# Patient Record
Sex: MALE | Race: BLACK OR AFRICAN AMERICAN | NOT HISPANIC OR LATINO | Employment: UNEMPLOYED | URBAN - METROPOLITAN AREA
[De-identification: names, ages, dates, MRNs, and addresses within clinical notes are randomized per-mention and may not be internally consistent; named-entity substitution may affect disease eponyms.]

---

## 2024-05-03 ENCOUNTER — HOSPITAL ENCOUNTER (EMERGENCY)
Facility: HOSPITAL | Age: 59
Discharge: HOME | End: 2024-05-04
Attending: EMERGENCY MEDICINE

## 2024-05-03 DIAGNOSIS — Z76.89 ENCOUNTER FOR SOCIAL WORK INTERVENTION: Primary | ICD-10-CM

## 2024-05-03 DIAGNOSIS — F22 DELUSION (MULTI): ICD-10-CM

## 2024-05-03 LAB
BASOPHILS # BLD AUTO: 0.04 X10*3/UL (ref 0–0.1)
BASOPHILS NFR BLD AUTO: 0.7 %
EOSINOPHIL # BLD AUTO: 0.18 X10*3/UL (ref 0–0.7)
EOSINOPHIL NFR BLD AUTO: 3.4 %
ERYTHROCYTE [DISTWIDTH] IN BLOOD BY AUTOMATED COUNT: 13.4 % (ref 11.5–14.5)
HCT VFR BLD AUTO: 39.9 % (ref 41–52)
HGB BLD-MCNC: 12.9 G/DL (ref 13.5–17.5)
IMM GRANULOCYTES # BLD AUTO: 0.02 X10*3/UL (ref 0–0.7)
IMM GRANULOCYTES NFR BLD AUTO: 0.4 % (ref 0–0.9)
LYMPHOCYTES # BLD AUTO: 1.53 X10*3/UL (ref 1.2–4.8)
LYMPHOCYTES NFR BLD AUTO: 28.5 %
MCH RBC QN AUTO: 29.2 PG (ref 26–34)
MCHC RBC AUTO-ENTMCNC: 32.3 G/DL (ref 32–36)
MCV RBC AUTO: 90 FL (ref 80–100)
MONOCYTES # BLD AUTO: 0.49 X10*3/UL (ref 0.1–1)
MONOCYTES NFR BLD AUTO: 9.1 %
NEUTROPHILS # BLD AUTO: 3.1 X10*3/UL (ref 1.2–7.7)
NEUTROPHILS NFR BLD AUTO: 57.9 %
NRBC BLD-RTO: 0 /100 WBCS (ref 0–0)
PLATELET # BLD AUTO: 263 X10*3/UL (ref 150–450)
RBC # BLD AUTO: 4.42 X10*6/UL (ref 4.5–5.9)
WBC # BLD AUTO: 5.4 X10*3/UL (ref 4.4–11.3)

## 2024-05-03 PROCEDURE — 99284 EMERGENCY DEPT VISIT MOD MDM: CPT

## 2024-05-03 PROCEDURE — 85025 COMPLETE CBC W/AUTO DIFF WBC: CPT | Performed by: EMERGENCY MEDICINE

## 2024-05-03 PROCEDURE — 36415 COLL VENOUS BLD VENIPUNCTURE: CPT | Performed by: EMERGENCY MEDICINE

## 2024-05-03 PROCEDURE — 80053 COMPREHEN METABOLIC PANEL: CPT | Performed by: EMERGENCY MEDICINE

## 2024-05-03 PROCEDURE — 82077 ASSAY SPEC XCP UR&BREATH IA: CPT | Performed by: EMERGENCY MEDICINE

## 2024-05-03 PROCEDURE — 99284 EMERGENCY DEPT VISIT MOD MDM: CPT | Performed by: EMERGENCY MEDICINE

## 2024-05-03 ASSESSMENT — COLUMBIA-SUICIDE SEVERITY RATING SCALE - C-SSRS
6. HAVE YOU EVER DONE ANYTHING, STARTED TO DO ANYTHING, OR PREPARED TO DO ANYTHING TO END YOUR LIFE?: NO
1. IN THE PAST MONTH, HAVE YOU WISHED YOU WERE DEAD OR WISHED YOU COULD GO TO SLEEP AND NOT WAKE UP?: NO
2. HAVE YOU ACTUALLY HAD ANY THOUGHTS OF KILLING YOURSELF?: NO

## 2024-05-04 VITALS
WEIGHT: 199 LBS | TEMPERATURE: 97.6 F | HEART RATE: 76 BPM | OXYGEN SATURATION: 97 % | SYSTOLIC BLOOD PRESSURE: 148 MMHG | RESPIRATION RATE: 16 BRPM | DIASTOLIC BLOOD PRESSURE: 96 MMHG | HEIGHT: 73 IN | BODY MASS INDEX: 26.37 KG/M2

## 2024-05-04 LAB
ALBUMIN SERPL BCP-MCNC: 3.9 G/DL (ref 3.4–5)
ALP SERPL-CCNC: 55 U/L (ref 33–120)
ALT SERPL W P-5'-P-CCNC: 9 U/L (ref 10–52)
AMPHETAMINES UR QL SCN: NORMAL
ANION GAP SERPL CALC-SCNC: 14 MMOL/L (ref 10–20)
APPEARANCE UR: CLEAR
AST SERPL W P-5'-P-CCNC: 16 U/L (ref 9–39)
BARBITURATES UR QL SCN: NORMAL
BENZODIAZ UR QL SCN: NORMAL
BILIRUB SERPL-MCNC: 0.4 MG/DL (ref 0–1.2)
BILIRUB UR STRIP.AUTO-MCNC: NEGATIVE MG/DL
BUN SERPL-MCNC: 19 MG/DL (ref 6–23)
BZE UR QL SCN: NORMAL
CALCIUM SERPL-MCNC: 9.4 MG/DL (ref 8.6–10.6)
CANNABINOIDS UR QL SCN: NORMAL
CHLORIDE SERPL-SCNC: 104 MMOL/L (ref 98–107)
CO2 SERPL-SCNC: 28 MMOL/L (ref 21–32)
COLOR UR: NORMAL
CREAT SERPL-MCNC: 1.12 MG/DL (ref 0.5–1.3)
EGFRCR SERPLBLD CKD-EPI 2021: 76 ML/MIN/1.73M*2
ETHANOL SERPL-MCNC: <10 MG/DL
FENTANYL+NORFENTANYL UR QL SCN: NORMAL
GLUCOSE SERPL-MCNC: 91 MG/DL (ref 74–99)
GLUCOSE UR STRIP.AUTO-MCNC: NORMAL MG/DL
HOLD SPECIMEN: NORMAL
KETONES UR STRIP.AUTO-MCNC: NEGATIVE MG/DL
LEUKOCYTE ESTERASE UR QL STRIP.AUTO: NEGATIVE
METHADONE UR QL SCN: NORMAL
NITRITE UR QL STRIP.AUTO: NEGATIVE
OPIATES UR QL SCN: NORMAL
OXYCODONE+OXYMORPHONE UR QL SCN: NORMAL
PCP UR QL SCN: NORMAL
PH UR STRIP.AUTO: 6.5 [PH]
POTASSIUM SERPL-SCNC: 3.7 MMOL/L (ref 3.5–5.3)
PROT SERPL-MCNC: 8 G/DL (ref 6.4–8.2)
PROT UR STRIP.AUTO-MCNC: NEGATIVE MG/DL
RBC # UR STRIP.AUTO: NEGATIVE /UL
SODIUM SERPL-SCNC: 142 MMOL/L (ref 136–145)
SP GR UR STRIP.AUTO: 1.02
UROBILINOGEN UR STRIP.AUTO-MCNC: NORMAL MG/DL

## 2024-05-04 PROCEDURE — 80307 DRUG TEST PRSMV CHEM ANLYZR: CPT | Performed by: EMERGENCY MEDICINE

## 2024-05-04 PROCEDURE — 81003 URINALYSIS AUTO W/O SCOPE: CPT | Performed by: EMERGENCY MEDICINE

## 2024-05-04 SDOH — HEALTH STABILITY: MENTAL HEALTH: NON-SPECIFIC ACTIVE SUICIDAL THOUGHTS (PAST 1 MONTH): NO

## 2024-05-04 SDOH — HEALTH STABILITY: MENTAL HEALTH: SUICIDAL BEHAVIOR (LIFETIME): NO

## 2024-05-04 SDOH — HEALTH STABILITY: MENTAL HEALTH: ANXIETY SYMPTOMS: NO PROBLEMS REPORTED OR OBSERVED.

## 2024-05-04 SDOH — HEALTH STABILITY: MENTAL HEALTH: WISH TO BE DEAD (PAST 1 MONTH): NO

## 2024-05-04 SDOH — ECONOMIC STABILITY: HOUSING INSECURITY: FEELS SAFE LIVING IN HOME: NO

## 2024-05-04 SDOH — HEALTH STABILITY: MENTAL HEALTH: DEPRESSION SYMPTOMS: NO PROBLEMS REPORTED OR OBSERVED.

## 2024-05-04 ASSESSMENT — LIFESTYLE VARIABLES
SUBSTANCE_ABUSE_PAST_12_MONTHS: NO
PRESCIPTION_ABUSE_PAST_12_MONTHS: NO
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
## 2024-05-04 NOTE — PROGRESS NOTES
Care of patient was taken over at [address]. See previous provider note for details of history and presentation, initial workup and management.     Briefly, this is a 50-year-old male who presents to the emergency department requesting to speak with social work regarding his housing status.  Noted to have mood disorder endorses taking medications regularly.  Was noted to be linear, appropriate without any SI, HI or inappropriate internal stimulation.  Signed out pending social work with resources.    During my care of the patient, social work did evaluate the patient, please see their note for further details, discussed with patient he will likely be best served for [facility], she did additionally speak with the patient's brother and confirmed does have  that has been working with him.  During my evaluation of the patient, patient stated that he was a prophet and was going to heaven, continued to endorse these thoughts-possibly his baseline with his underlying psych history, however will have EPAT evaluate the patient.     EPAT did see the patient, they endorse patient is at his baseline and does not meet inpatient psych criteria.  He remains stable, is discharged with a bus pass and plan for [facility].       Diagnoses as of 05/04/24 1922   Encounter for social work intervention   Delusion (Multi)        Pt was discussed with ED Attending, Dr. Mills.     Valeriano Vincent DO   EM PGY3

## 2024-05-04 NOTE — ED TRIAGE NOTES
Patient to ED requesting to speak to a psychiatrist about his meds he should be taking and also requesting to speak to a  for help with housing. Patient denies SI, HI, and hallucinations. Patient has been staying at [address] for a few weeks and reports he needs somewhere new to stay due to conflicts.

## 2024-05-04 NOTE — DISCHARGE INSTRUCTIONS
You were seen in the ED for housing insecurity. You should follow up with your regular doctor in 2-3d for a re-check.  You should return to the ED immediately if you develop other concerning symptoms.
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
## 2024-06-18 ENCOUNTER — HOSPITAL ENCOUNTER (EMERGENCY)
Facility: HOSPITAL | Age: 59
Discharge: HOME | End: 2024-06-18

## 2024-06-18 VITALS
RESPIRATION RATE: 18 BRPM | HEART RATE: 86 BPM | SYSTOLIC BLOOD PRESSURE: 160 MMHG | DIASTOLIC BLOOD PRESSURE: 100 MMHG | OXYGEN SATURATION: 96 % | TEMPERATURE: 98.4 F

## 2024-06-18 PROCEDURE — 82947 ASSAY GLUCOSE BLOOD QUANT: CPT

## 2024-06-18 PROCEDURE — 4500999001 HC ED NO CHARGE

## 2024-06-18 ASSESSMENT — COLUMBIA-SUICIDE SEVERITY RATING SCALE - C-SSRS
2. HAVE YOU ACTUALLY HAD ANY THOUGHTS OF KILLING YOURSELF?: NO
1. IN THE PAST MONTH, HAVE YOU WISHED YOU WERE DEAD OR WISHED YOU COULD GO TO SLEEP AND NOT WAKE UP?: NO
6. HAVE YOU EVER DONE ANYTHING, STARTED TO DO ANYTHING, OR PREPARED TO DO ANYTHING TO END YOUR LIFE?: NO

## 2024-06-18 NOTE — ED TRIAGE NOTES
"REPORTS TO ED FOR \"DIABETIC CHECK UP\" DOESN'T REPORT ANY SYMPTOMS. STATES HE DOESN'T HAVE DIABETES BUT CAME \"TO SEE IF DID.\" DENIES ABD PAIN, CP, SOB, BV, HA, DIZZINESS, N/V. BGL 95  "

## 2024-06-19 LAB — GLUCOSE BLD MANUAL STRIP-MCNC: 95 MG/DL (ref 74–99)

## 2024-09-16 ENCOUNTER — HOSPITAL ENCOUNTER (EMERGENCY)
Facility: HOSPITAL | Age: 59
Discharge: HOME | End: 2024-09-16

## 2024-09-16 VITALS
TEMPERATURE: 98.1 F | DIASTOLIC BLOOD PRESSURE: 88 MMHG | HEART RATE: 84 BPM | WEIGHT: 199 LBS | BODY MASS INDEX: 26.37 KG/M2 | RESPIRATION RATE: 16 BRPM | OXYGEN SATURATION: 99 % | HEIGHT: 73 IN | SYSTOLIC BLOOD PRESSURE: 138 MMHG

## 2024-09-16 DIAGNOSIS — Z00.00 WELL ADULT HEALTH CHECK: Primary | ICD-10-CM

## 2024-09-16 PROBLEM — I10 HYPERTENSION: Status: ACTIVE | Noted: 2024-03-12

## 2024-09-16 PROBLEM — R41.844 FRONTAL LOBE AND EXECUTIVE FUNCTION DEFICIT: Status: ACTIVE | Noted: 2024-03-06

## 2024-09-16 PROBLEM — F23: Status: ACTIVE | Noted: 2023-05-25

## 2024-09-16 PROCEDURE — 99284 EMERGENCY DEPT VISIT MOD MDM: CPT | Performed by: NURSE PRACTITIONER

## 2024-09-16 PROCEDURE — 99282 EMERGENCY DEPT VISIT SF MDM: CPT

## 2024-09-16 SDOH — HEALTH STABILITY: MENTAL HEALTH: HAVE YOU ACTUALLY HAD ANY THOUGHTS OF KILLING YOURSELF?: NO

## 2024-09-16 SDOH — HEALTH STABILITY: MENTAL HEALTH: SUICIDE ASSESSMENT: ADULT (C-SSRS)

## 2024-09-16 SDOH — HEALTH STABILITY: MENTAL HEALTH: BEHAVIORS/MOOD: ANXIOUS;AGITATED

## 2024-09-16 SDOH — HEALTH STABILITY: MENTAL HEALTH: BEHAVIORAL HEALTH(WDL): EXCEPTIONS TO WDL

## 2024-09-16 SDOH — HEALTH STABILITY: MENTAL HEALTH: DELUSIONS: PERSECUTORY;RELIGIOUS

## 2024-09-16 SDOH — HEALTH STABILITY: MENTAL HEALTH: HAVE YOU WISHED YOU WERE DEAD OR WISHED YOU COULD GO TO SLEEP AND NOT WAKE UP?: NO

## 2024-09-16 SDOH — HEALTH STABILITY: MENTAL HEALTH: HAVE YOU EVER DONE ANYTHING, STARTED TO DO ANYTHING, OR PREPARED TO DO ANYTHING TO END YOUR LIFE?: NO

## 2024-09-16 ASSESSMENT — PAIN - FUNCTIONAL ASSESSMENT: PAIN_FUNCTIONAL_ASSESSMENT: 0-10

## 2024-09-16 ASSESSMENT — LIFESTYLE VARIABLES
TOTAL SCORE: 0
EVER FELT BAD OR GUILTY ABOUT YOUR DRINKING: NO
HAVE YOU EVER FELT YOU SHOULD CUT DOWN ON YOUR DRINKING: NO
EVER HAD A DRINK FIRST THING IN THE MORNING TO STEADY YOUR NERVES TO GET RID OF A HANGOVER: NO
HAVE PEOPLE ANNOYED YOU BY CRITICIZING YOUR DRINKING: NO

## 2024-09-16 ASSESSMENT — PAIN SCALES - GENERAL: PAINLEVEL_OUTOF10: 0 - NO PAIN
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
## 2024-11-24 ENCOUNTER — HOSPITAL ENCOUNTER (EMERGENCY)
Facility: HOSPITAL | Age: 59
Discharge: HOME | End: 2024-11-24

## 2024-11-24 VITALS
SYSTOLIC BLOOD PRESSURE: 143 MMHG | RESPIRATION RATE: 16 BRPM | OXYGEN SATURATION: 98 % | HEIGHT: 73 IN | TEMPERATURE: 97.9 F | HEART RATE: 79 BPM | DIASTOLIC BLOOD PRESSURE: 89 MMHG | WEIGHT: 206 LBS | BODY MASS INDEX: 27.3 KG/M2

## 2024-11-24 DIAGNOSIS — K08.89 PAIN, DENTAL: Primary | ICD-10-CM

## 2024-11-24 DIAGNOSIS — Z59.819 HOUSING INSECURITY: ICD-10-CM

## 2024-11-24 DIAGNOSIS — M54.50 RIGHT-SIDED LOW BACK PAIN WITHOUT SCIATICA, UNSPECIFIED CHRONICITY: ICD-10-CM

## 2024-11-24 PROCEDURE — 99284 EMERGENCY DEPT VISIT MOD MDM: CPT | Performed by: PHYSICIAN ASSISTANT

## 2024-11-24 PROCEDURE — 2500000001 HC RX 250 WO HCPCS SELF ADMINISTERED DRUGS (ALT 637 FOR MEDICARE OP): Performed by: PHYSICIAN ASSISTANT

## 2024-11-24 PROCEDURE — 99283 EMERGENCY DEPT VISIT LOW MDM: CPT

## 2024-11-24 RX ORDER — IBUPROFEN 600 MG/1
600 TABLET ORAL EVERY 6 HOURS PRN
Qty: 30 TABLET | Refills: 0 | Status: SHIPPED | OUTPATIENT
Start: 2024-11-24 | End: 2024-11-24

## 2024-11-24 RX ORDER — ACETAMINOPHEN 325 MG/1
650 TABLET ORAL EVERY 6 HOURS PRN
Qty: 30 TABLET | Refills: 0 | Status: SHIPPED | OUTPATIENT
Start: 2024-11-24 | End: 2024-11-24

## 2024-11-24 RX ORDER — CLINDAMYCIN HYDROCHLORIDE 150 MG/1
450 CAPSULE ORAL 3 TIMES DAILY
Qty: 63 CAPSULE | Refills: 0 | Status: SHIPPED | OUTPATIENT
Start: 2024-11-24 | End: 2024-12-01

## 2024-11-24 RX ORDER — IBUPROFEN 600 MG/1
600 TABLET ORAL EVERY 6 HOURS PRN
Qty: 30 TABLET | Refills: 0 | Status: SHIPPED | OUTPATIENT
Start: 2024-11-24

## 2024-11-24 RX ORDER — CLINDAMYCIN HYDROCHLORIDE 150 MG/1
450 CAPSULE ORAL 3 TIMES DAILY
Qty: 63 CAPSULE | Refills: 0 | Status: SHIPPED | OUTPATIENT
Start: 2024-11-24 | End: 2024-11-24

## 2024-11-24 RX ORDER — ACETAMINOPHEN 325 MG/1
975 TABLET ORAL ONCE
Status: COMPLETED | OUTPATIENT
Start: 2024-11-24 | End: 2024-11-24

## 2024-11-24 RX ORDER — ACETAMINOPHEN 325 MG/1
650 TABLET ORAL EVERY 6 HOURS PRN
Qty: 30 TABLET | Refills: 0 | Status: SHIPPED | OUTPATIENT
Start: 2024-11-24

## 2024-11-24 RX ADMIN — CLINDAMYCIN HYDROCHLORIDE 450 MG: 300 CAPSULE ORAL at 08:23

## 2024-11-24 RX ADMIN — ACETAMINOPHEN 975 MG: 325 TABLET ORAL at 08:23

## 2024-11-24 SDOH — ECONOMIC STABILITY - HOUSING INSECURITY: HOUSING INSTABILITY UNSPECIFIED: Z59.819

## 2024-11-24 ASSESSMENT — PAIN - FUNCTIONAL ASSESSMENT: PAIN_FUNCTIONAL_ASSESSMENT: 0-10

## 2024-11-24 ASSESSMENT — COLUMBIA-SUICIDE SEVERITY RATING SCALE - C-SSRS
1. IN THE PAST MONTH, HAVE YOU WISHED YOU WERE DEAD OR WISHED YOU COULD GO TO SLEEP AND NOT WAKE UP?: NO
2. HAVE YOU ACTUALLY HAD ANY THOUGHTS OF KILLING YOURSELF?: NO
6. HAVE YOU EVER DONE ANYTHING, STARTED TO DO ANYTHING, OR PREPARED TO DO ANYTHING TO END YOUR LIFE?: NO

## 2024-11-24 ASSESSMENT — PAIN SCALES - GENERAL: PAINLEVEL_OUTOF10: 0 - NO PAIN

## 2024-11-24 NOTE — ED TRIAGE NOTES
PT states when he eats he gets abdominal pain and has been losing weight. States he fell out and has pain in mouth. Wants to get his blood checked

## 2024-11-24 NOTE — ED PROVIDER NOTES
"Emergency Department Provider Note        History of Present Illness     59-year-old male with history of schizophrenia, HTN, housing insecurity presenting for dental pain and right lower back pain.  States his mouth has been hurting for \"a while\" has not been to a dentist for.  Denies any trouble breathing or trouble swallowing.  Denies any fevers chills night sweats or rigors.  Has not taken anything for it.  For his right lower back pain states that he fell \"a while ago\" which appears to be greater than weeks ago.  States he has had pain since then.  Pain only happens with certain movements like lateral rotation.  Minimal to no pain currently.  Denies any lower extremity weakness or paresthesias.  Denies any bowel or bladder incontinence.  Does not take anything for the pain.  Also is requesting laboratory work stating that his sperm is drying out.  States he wants this looked into more.  He denies any AH/VH/SI/HI    External Records Reviewed including ED notes, H&P, Discharge Summary, outpatient PCP/specialist notes.  Physical Exam     Triage Vitals: T 36.6 °C (97.9 °F)  HR 79  /89  RR 16  O2 98 %    GEN: NAD  EYES:  EOMs grossly intact, anicteric sclera  JUSTINE: Mucosa moist.  No trismus, normal voice, tolerate secretions without difficulty, uvula is midline.  Eroded molars worse in the right lower posterior with mild gumline swelling medial to it  NECK: Supple.  CARD: RRR  PULMONARY: Moving air well. Clear all lung fields.  ABDOMEN: Soft, no guarding, no rigidity. Nontender. NABS  EXTREMITIES: Full ROM, no pitting edema,   Back: No midline tenderness step-offs or deformities, very mild right lower paraspinal tenderness without any overlying skin change  SKIN: Intact, warm and dry  NEURO: Alert and oriented x 3, speech is clear, no obvious deficits noted.  Intact sensation/strength in lower extremities with no saddle paresthesia, intact deep tendon reflexes, nonantalgic gait        Medical Decision " "Making & ED Course     59-year-old male presenting for dental pain and chronic lower back pains.  On exam he is well-appearing ambulating [place].  Vital signs stable.  Neuro intact in his lower extremities.  No red flag signs symptoms for back pain to be concern for any spinal cord pathology.  Additionally no red flag signs or symptoms to be concern for any obvious drainable dental abscess, no dental abscess apparent on exam either.  Patient did become mildly agitated when was told that we would not be able to do what laboratory work to investigate his \"inadequate sperm\"however later excepting.  Will give him a course of clindamycin for his dental pain out of concern for underlying infection with known penicillin and allergy.  Will give analgesics.  Will give referral for PCP and dental clinic.  Return precautions reviewed.    Diagnoses as of 11/24/24 0815   Pain, dental   Right-sided low back pain without sciatica, unspecified chronicity   Housing insecurity     No orders to display     Labs Reviewed - No data to display    ----------------------------------------------------------------------------------------------------------------------------    This note was dictated using a speech recognition program.  While an attempt was made at proof reading to minimize errors, minor errors in transcription may be present call for questions.     Kirk Andersen PA-C  11/24/24 0820    "

## 2024-11-24 NOTE — DISCHARGE INSTRUCTIONS
Take the full course of the antibiotics.  Take the Tylenol and ibuprofen for any pain.  Follow-up with the dental clinic, call [phone] to schedule an appointment.  It is very important follow-up with a primary care doctor, if you have not heard from them call the number listed below

## 2025-01-27 ENCOUNTER — HOSPITAL ENCOUNTER (EMERGENCY)
Facility: HOSPITAL | Age: 60
Discharge: LEFT BEFORE DISCHARGE | End: 2025-01-27

## 2025-01-27 VITALS
SYSTOLIC BLOOD PRESSURE: 179 MMHG | HEART RATE: 88 BPM | OXYGEN SATURATION: 99 % | TEMPERATURE: 98.8 F | BODY MASS INDEX: 26.51 KG/M2 | RESPIRATION RATE: 18 BRPM | DIASTOLIC BLOOD PRESSURE: 106 MMHG | WEIGHT: 200 LBS | HEIGHT: 73 IN

## 2025-01-27 DIAGNOSIS — R63.4 UNEXPLAINED WEIGHT LOSS: Primary | ICD-10-CM

## 2025-01-27 LAB
ALBUMIN SERPL BCP-MCNC: 4 G/DL (ref 3.4–5)
ALP SERPL-CCNC: 54 U/L (ref 33–120)
ALT SERPL W P-5'-P-CCNC: 29 U/L (ref 10–52)
ANION GAP SERPL CALC-SCNC: 12 MMOL/L (ref 10–20)
APPEARANCE UR: CLEAR
AST SERPL W P-5'-P-CCNC: 22 U/L (ref 9–39)
BASOPHILS # BLD AUTO: 0.03 X10*3/UL (ref 0–0.1)
BASOPHILS NFR BLD AUTO: 0.4 %
BILIRUB SERPL-MCNC: 0.5 MG/DL (ref 0–1.2)
BILIRUB UR STRIP.AUTO-MCNC: NEGATIVE MG/DL
BUN SERPL-MCNC: 13 MG/DL (ref 6–23)
CALCIUM SERPL-MCNC: 9.7 MG/DL (ref 8.6–10.6)
CHLORIDE SERPL-SCNC: 103 MMOL/L (ref 98–107)
CO2 SERPL-SCNC: 27 MMOL/L (ref 21–32)
COLOR UR: YELLOW
CREAT SERPL-MCNC: 0.85 MG/DL (ref 0.5–1.3)
EGFRCR SERPLBLD CKD-EPI 2021: >90 ML/MIN/1.73M*2
EOSINOPHIL # BLD AUTO: 0.14 X10*3/UL (ref 0–0.7)
EOSINOPHIL NFR BLD AUTO: 2.1 %
ERYTHROCYTE [DISTWIDTH] IN BLOOD BY AUTOMATED COUNT: 12.1 % (ref 11.5–14.5)
GLUCOSE SERPL-MCNC: 84 MG/DL (ref 74–99)
GLUCOSE UR STRIP.AUTO-MCNC: NORMAL MG/DL
HCT VFR BLD AUTO: 36.3 % (ref 41–52)
HGB BLD-MCNC: 11.8 G/DL (ref 13.5–17.5)
IMM GRANULOCYTES # BLD AUTO: 0.03 X10*3/UL (ref 0–0.7)
IMM GRANULOCYTES NFR BLD AUTO: 0.4 % (ref 0–0.9)
KETONES UR STRIP.AUTO-MCNC: NEGATIVE MG/DL
LEUKOCYTE ESTERASE UR QL STRIP.AUTO: NEGATIVE
LIPASE SERPL-CCNC: 32 U/L (ref 9–82)
LYMPHOCYTES # BLD AUTO: 1.19 X10*3/UL (ref 1.2–4.8)
LYMPHOCYTES NFR BLD AUTO: 17.8 %
MCH RBC QN AUTO: 29.4 PG (ref 26–34)
MCHC RBC AUTO-ENTMCNC: 32.5 G/DL (ref 32–36)
MCV RBC AUTO: 91 FL (ref 80–100)
MONOCYTES # BLD AUTO: 0.56 X10*3/UL (ref 0.1–1)
MONOCYTES NFR BLD AUTO: 8.4 %
MUCOUS THREADS #/AREA URNS AUTO: NORMAL /LPF
NEUTROPHILS # BLD AUTO: 4.75 X10*3/UL (ref 1.2–7.7)
NEUTROPHILS NFR BLD AUTO: 70.9 %
NITRITE UR QL STRIP.AUTO: NEGATIVE
NRBC BLD-RTO: 0 /100 WBCS (ref 0–0)
PH UR STRIP.AUTO: 5.5 [PH]
PLATELET # BLD AUTO: 263 X10*3/UL (ref 150–450)
POTASSIUM SERPL-SCNC: 3.6 MMOL/L (ref 3.5–5.3)
PROT SERPL-MCNC: 8.1 G/DL (ref 6.4–8.2)
PROT UR STRIP.AUTO-MCNC: ABNORMAL MG/DL
RBC # BLD AUTO: 4.01 X10*6/UL (ref 4.5–5.9)
RBC # UR STRIP.AUTO: NEGATIVE /UL
RBC #/AREA URNS AUTO: NORMAL /HPF
SODIUM SERPL-SCNC: 138 MMOL/L (ref 136–145)
SP GR UR STRIP.AUTO: 1.03
TSH SERPL-ACNC: 1.72 MIU/L (ref 0.44–3.98)
UROBILINOGEN UR STRIP.AUTO-MCNC: ABNORMAL MG/DL
WBC # BLD AUTO: 6.7 X10*3/UL (ref 4.4–11.3)
WBC #/AREA URNS AUTO: NORMAL /HPF

## 2025-01-27 PROCEDURE — 80053 COMPREHEN METABOLIC PANEL: CPT | Performed by: PHYSICIAN ASSISTANT

## 2025-01-27 PROCEDURE — 99283 EMERGENCY DEPT VISIT LOW MDM: CPT

## 2025-01-27 PROCEDURE — 81001 URINALYSIS AUTO W/SCOPE: CPT | Performed by: PHYSICIAN ASSISTANT

## 2025-01-27 PROCEDURE — 85025 COMPLETE CBC W/AUTO DIFF WBC: CPT | Performed by: PHYSICIAN ASSISTANT

## 2025-01-27 PROCEDURE — 99284 EMERGENCY DEPT VISIT MOD MDM: CPT | Performed by: PHYSICIAN ASSISTANT

## 2025-01-27 PROCEDURE — 84443 ASSAY THYROID STIM HORMONE: CPT | Performed by: PHYSICIAN ASSISTANT

## 2025-01-27 PROCEDURE — 36415 COLL VENOUS BLD VENIPUNCTURE: CPT | Performed by: PHYSICIAN ASSISTANT

## 2025-01-27 PROCEDURE — 83690 ASSAY OF LIPASE: CPT | Performed by: PHYSICIAN ASSISTANT

## 2025-01-27 ASSESSMENT — PAIN - FUNCTIONAL ASSESSMENT: PAIN_FUNCTIONAL_ASSESSMENT: 0-10

## 2025-01-27 ASSESSMENT — PAIN DESCRIPTION - PAIN TYPE: TYPE: ACUTE PAIN

## 2025-01-27 ASSESSMENT — PAIN DESCRIPTION - LOCATION: LOCATION: ABDOMEN

## 2025-01-27 ASSESSMENT — PAIN SCALES - GENERAL: PAINLEVEL_OUTOF10: 3

## 2025-01-27 NOTE — ED PROVIDER NOTES
Emergency Department Provider Note        History of Present Illness     History provided by: Patient  Limitations to History: None  External Records Reviewed with Brief Summary:  notes from [facility] today as well as previous ER visits    HPI:  Gareth Kaminski is a 59 y.o. male with history of schizophrenia, anemia, hypertension who presents today for evaluation of abdominal discomfort and weight loss.  Patient states he had unexplained weight loss approximately 10 to 15 pounds over the last month, patient states he is not sure why it is happening, denies night sweats.  Patient also endorses that his entire abdomen feels uncomfortable, denies nausea vomiting fevers chills, urinary symptoms, blood in his stool, change in stool caliber, constipation or diarrhea.  Patient denies chest pain or shortness of breath.  Patient is requesting blood work.    Physical Exam   Triage vitals:  T 37.1 °C (98.8 °F)  HR 88  BP (!) 179/106  RR 18  O2 99 % None (Room air)    Physical Exam  Vitals and nursing note reviewed.   Constitutional:       General: He is not in acute distress.     Appearance: Normal appearance. He is not toxic-appearing.   HENT:      Head: Normocephalic and atraumatic.      Nose: Nose normal.   Eyes:      Extraocular Movements: Extraocular movements intact.   Cardiovascular:      Rate and Rhythm: Normal rate and regular rhythm.   Pulmonary:      Effort: Pulmonary effort is normal.   Abdominal:      General: There is no distension.      Palpations: Abdomen is soft. There is no hepatomegaly or mass.      Tenderness: There is no abdominal tenderness. There is no right CVA tenderness, left CVA tenderness, guarding or rebound.   Musculoskeletal:         General: Normal range of motion.      Cervical back: Normal range of motion and neck supple.   Skin:     General: Skin is warm and dry.   Neurological:      General: No focal deficit present.      Mental Status: He is alert.   Psychiatric:         Mood and Affect: Mood  normal.         Thought Content: Thought content normal.        CT chest abdomen pelvis w IV contrast    (Results Pending)     Labs Reviewed   CBC WITH AUTO DIFFERENTIAL   COMPREHENSIVE METABOLIC PANEL   LIPASE   URINALYSIS WITH REFLEX CULTURE AND MICROSCOPIC    Narrative:     The following orders were created for panel order Urinalysis with Reflex Culture and Microscopic.  Procedure                               Abnormality         Status                     ---------                               -----------         ------                     Urinalysis with Reflex C...[[id number]]                                                 Extra Urine Gray Tube[[id number]]                                                         Please view results for these tests on the individual orders.   URINALYSIS WITH REFLEX CULTURE AND MICROSCOPIC   EXTRA URINE GRAY TUBE     ED Course as of 01/27/25 1646   Mon Jan 27, 2025   1513 Patient is refusing CT at this time, I discussed with him that the indication is to check for cancer and with his unexplained weight loss would be irresponsible of us not to check.  Patient does have capacity, he does have a history of schizophrenia however he is alert and oriented, is able to repeat the risks back to me, he is aware of the reason that were ordering the CAT scan and he wishes to decline even if we were to do it without contrast which I offered to him as well.  He does not want a CT scan whatsoever.  He states he just wants blood work.  Patient is aware of the potential risk of missed diagnosis such as cancer, this could result in death permanent disability and he is understanding of this.  He would like to decline CAT scan both with and without contrast. [MK]   1629 Patient was notified of elevated blood pressure reading and to follow-up with PCP about this. [MK]      ED Course User Index  [MK] Molly Montaño PA-C         Diagnoses as of 01/27/25 1646   Unexplained weight loss         Medical  "Decision Making & ED Course   Medical Decision Makin y.o. male presents today for evaluation of abdominal discomfort and unexplained weight loss, given that he is 59, I reviewed his previous imaging and he is never had a CT of the abdomen, given unexplained weight loss I did order CT chest abdomen pelvis as well as labs.  As the nurses were trying to place an IV I was notified that patient is \"allergic to IVs\".  I discussed with patient that the IV would be to administer contrast so that we can get a better idea of what is going on internally and if this could be cancer.  Patient does not wish to have an IV, I discussed with him getting a noncontrast CT, he is aware that this would be a suboptimal study however it is better than nothing and patient states that he does not want a CT scan overall, only blood work.  I discussed with patient the potential for missed diagnosis such as malignancy, his blood work came back normal, CBC CMP lipase TSH and urinalysis without any clear reason for his unexplained weight loss.  I again reiterated that I would like to get a CT however patient again is adamant that he does not want 1.  He does have a baseline history of schizophrenia but is alert and oriented, is able to repeat the risks back to me, feel that he has capacity to decline this test.  Patient did elect to sign out AGAINST MEDICAL ADVICE given that he is refusing CT.  I also notified him of his elevated blood pressure reading, encouraged PCP follow-up.  Patient expressed understanding.  ----      Differential diagnoses considered include but are not limited to: Hyperthyroidism, cancer, decreased appetite, etc.     Care Considerations: As documented above in [facility]    ED Course:  ED Course as of 25 1646      1513 Patient is refusing CT at this time, I discussed with him that the indication is to check for cancer and with his unexplained weight loss would be irresponsible of us not to check.  " Patient does have capacity, he does have a history of schizophrenia however he is alert and oriented, is able to repeat the risks back to me, he is aware of the reason that were ordering the CAT scan and he wishes to decline even if we were to do it without contrast which I offered to him as well.  He does not want a CT scan whatsoever.  He states he just wants blood work.  Patient is aware of the potential risk of missed diagnosis such as cancer, this could result in death permanent disability and he is understanding of this.  He would like to decline CAT scan both with and without contrast. [MK]   1629 Patient was notified of elevated blood pressure reading and to follow-up with PCP about this. [MK]      ED Course User Index  [JIMY] Molly Montaño PA-C         Diagnoses as of 01/27/25 1646   Unexplained weight loss     Disposition   AMA    Procedures   Procedures    Patient was seen independently    Molly Montaño PA-C  Emergency Medicine       Molly Montaño PA-C  01/27/25 1648

## 2025-01-27 NOTE — DISCHARGE INSTRUCTIONS
You have elected to forego CT today, I do have concerns that this could still be related to cancer, important to follow-up here with your primary care doctor for further workup.

## 2025-01-27 NOTE — ED TRIAGE NOTES
Patient presents to the Emergency department with a chief complaint of diffuse, non-specific abdominal pain pain without nausea, vomiting, or blood in his stool and weight loss over the last month (approximately 10-15lbs). Patient denies any other medical complaints at this time.

## 2025-01-28 LAB — HOLD SPECIMEN: NORMAL

## 2025-04-02 ENCOUNTER — HOSPITAL ENCOUNTER (OUTPATIENT)
Facility: HOSPITAL | Age: 60
Setting detail: OBSERVATION
Discharge: HOME | End: 2025-04-03
Attending: EMERGENCY MEDICINE | Admitting: EMERGENCY MEDICINE

## 2025-04-02 ENCOUNTER — CLINICAL SUPPORT (OUTPATIENT)
Dept: EMERGENCY MEDICINE | Facility: HOSPITAL | Age: 60
End: 2025-04-02

## 2025-04-02 DIAGNOSIS — Z76.89 ENCOUNTER FOR PSYCHIATRIC ASSESSMENT: Primary | ICD-10-CM

## 2025-04-02 LAB
ALBUMIN SERPL BCP-MCNC: 4.1 G/DL (ref 3.4–5)
ALP SERPL-CCNC: 62 U/L (ref 33–120)
ALT SERPL W P-5'-P-CCNC: 14 U/L (ref 10–52)
ANION GAP SERPL CALC-SCNC: 11 MMOL/L (ref 10–20)
APAP SERPL-MCNC: <10 UG/ML
AST SERPL W P-5'-P-CCNC: 21 U/L (ref 9–39)
BASOPHILS # BLD AUTO: 0.04 X10*3/UL (ref 0–0.1)
BASOPHILS NFR BLD AUTO: 0.6 %
BILIRUB SERPL-MCNC: 0.4 MG/DL (ref 0–1.2)
BUN SERPL-MCNC: 18 MG/DL (ref 6–23)
CALCIUM SERPL-MCNC: 9.2 MG/DL (ref 8.6–10.6)
CHLORIDE SERPL-SCNC: 104 MMOL/L (ref 98–107)
CO2 SERPL-SCNC: 29 MMOL/L (ref 21–32)
CREAT SERPL-MCNC: 1.04 MG/DL (ref 0.5–1.3)
EGFRCR SERPLBLD CKD-EPI 2021: 83 ML/MIN/1.73M*2
EOSINOPHIL # BLD AUTO: 0.37 X10*3/UL (ref 0–0.7)
EOSINOPHIL NFR BLD AUTO: 5.7 %
ERYTHROCYTE [DISTWIDTH] IN BLOOD BY AUTOMATED COUNT: 12.9 % (ref 11.5–14.5)
ETHANOL SERPL-MCNC: <10 MG/DL
GLUCOSE SERPL-MCNC: 91 MG/DL (ref 74–99)
HCT VFR BLD AUTO: 39.6 % (ref 41–52)
HGB BLD-MCNC: 12.8 G/DL (ref 13.5–17.5)
IMM GRANULOCYTES # BLD AUTO: 0.02 X10*3/UL (ref 0–0.7)
IMM GRANULOCYTES NFR BLD AUTO: 0.3 % (ref 0–0.9)
LYMPHOCYTES # BLD AUTO: 1.67 X10*3/UL (ref 1.2–4.8)
LYMPHOCYTES NFR BLD AUTO: 25.7 %
MCH RBC QN AUTO: 30.2 PG (ref 26–34)
MCHC RBC AUTO-ENTMCNC: 32.3 G/DL (ref 32–36)
MCV RBC AUTO: 93 FL (ref 80–100)
MONOCYTES # BLD AUTO: 0.58 X10*3/UL (ref 0.1–1)
MONOCYTES NFR BLD AUTO: 8.9 %
NEUTROPHILS # BLD AUTO: 3.81 X10*3/UL (ref 1.2–7.7)
NEUTROPHILS NFR BLD AUTO: 58.8 %
NRBC BLD-RTO: 0 /100 WBCS (ref 0–0)
PLATELET # BLD AUTO: 221 X10*3/UL (ref 150–450)
POTASSIUM SERPL-SCNC: 3.6 MMOL/L (ref 3.5–5.3)
PROT SERPL-MCNC: 8 G/DL (ref 6.4–8.2)
RBC # BLD AUTO: 4.24 X10*6/UL (ref 4.5–5.9)
SALICYLATES SERPL-MCNC: <3 MG/DL
SODIUM SERPL-SCNC: 140 MMOL/L (ref 136–145)
WBC # BLD AUTO: 6.5 X10*3/UL (ref 4.4–11.3)

## 2025-04-02 PROCEDURE — 80143 DRUG ASSAY ACETAMINOPHEN: CPT

## 2025-04-02 PROCEDURE — 85025 COMPLETE CBC W/AUTO DIFF WBC: CPT

## 2025-04-02 PROCEDURE — 99285 EMERGENCY DEPT VISIT HI MDM: CPT

## 2025-04-02 PROCEDURE — 93005 ELECTROCARDIOGRAM TRACING: CPT

## 2025-04-02 PROCEDURE — 36415 COLL VENOUS BLD VENIPUNCTURE: CPT

## 2025-04-02 PROCEDURE — 80053 COMPREHEN METABOLIC PANEL: CPT

## 2025-04-02 PROCEDURE — 99285 EMERGENCY DEPT VISIT HI MDM: CPT | Performed by: EMERGENCY MEDICINE

## 2025-04-02 PROCEDURE — 80320 DRUG SCREEN QUANTALCOHOLS: CPT

## 2025-04-02 RX ORDER — MIDAZOLAM HYDROCHLORIDE 5 MG/ML
5 INJECTION, SOLUTION INTRAMUSCULAR; INTRAVENOUS AS NEEDED
Status: DISCONTINUED | OUTPATIENT
Start: 2025-04-02 | End: 2025-04-03 | Stop reason: HOSPADM

## 2025-04-02 SDOH — HEALTH STABILITY: MENTAL HEALTH: BEHAVIORS/MOOD: HALLUCINATIONS;RESTLESS

## 2025-04-02 SDOH — HEALTH STABILITY: MENTAL HEALTH
OTHER SUICIDE PRECAUTIONS INCLUDE: PATIENT PLACED IN AN EASILY OBSERVABLE ROOM WITH DOOR/CURTAIN REMAINING OPEN;PATIENT PLACED IN GOWN (SNAPS OR PAPER GOWNS PREFERRED) AND WANDED;REMAINING RISKS IDENTIFIED AND MITIGATED;PATIENT PLACED IN PSYCH SAFE ROOM (IF AVAILABLE);PROVIDER NOTIFIED;ELOPEMENT RISK IDENTIFIED

## 2025-04-02 SDOH — HEALTH STABILITY: MENTAL HEALTH: BEHAVIORAL HEALTH(WDL): EXCEPTIONS TO WDL

## 2025-04-02 SDOH — SOCIAL STABILITY: SOCIAL INSECURITY: FAMILY BEHAVIORS: UNABLE TO ASSESS

## 2025-04-02 SDOH — SOCIAL STABILITY: SOCIAL NETWORK: VISITOR BEHAVIORS: UNABLE TO ASSESS

## 2025-04-02 ASSESSMENT — COLUMBIA-SUICIDE SEVERITY RATING SCALE - C-SSRS
6. HAVE YOU EVER DONE ANYTHING, STARTED TO DO ANYTHING, OR PREPARED TO DO ANYTHING TO END YOUR LIFE?: NO
2. HAVE YOU ACTUALLY HAD ANY THOUGHTS OF KILLING YOURSELF?: NO
1. IN THE PAST MONTH, HAVE YOU WISHED YOU WERE DEAD OR WISHED YOU COULD GO TO SLEEP AND NOT WAKE UP?: NO

## 2025-04-02 ASSESSMENT — PAIN - FUNCTIONAL ASSESSMENT: PAIN_FUNCTIONAL_ASSESSMENT: 0-10

## 2025-04-02 ASSESSMENT — PAIN SCALES - GENERAL: PAINLEVEL_OUTOF10: 0 - NO PAIN

## 2025-04-02 NOTE — ED TRIAGE NOTES
Patient presented to the ED for social work consult. He had asked to speak to social work for help with housing. The  informed the RN that patient needed a psych eval due to nonsensical thinking. Robbie is saying he is the prophet. Denies SI or HI. Patient is rambling in triage, tends to not answer questions.

## 2025-04-02 NOTE — PROGRESS NOTES
Gareth Kaminski is a 59 y.o. male on day 0 of admission presenting with No Principal Problem: There is no principal problem currently on the Problem List. Please update the Problem List and refresh..    Social Work Note; patient requested a social work conversation.    I met patient in a private area and asked what I could help him with.    He began talking about God, the bible, never killing anyone, urinating on the floor at the shelter (he has been at the shelter for a year and said he could no longer go there because of the urinating) and some incoherent words.  I asked him about drug use or any mental health diagnosis.  He denied both, however, his denial included  that he is a profit and God told him that he is well.  He did say he wanted help but did not want to go to a psychiatric hospital.   I did ask about family or contacts and he said he could not remember where his mom was or how to reach her.     I spoke to the triage team to update them on the conversation I had with him.  It appears that patient is struggling with his mental health and could benefit from the observation by a professional in that area.    Marcello Woods, SADIEW

## 2025-04-03 VITALS
WEIGHT: 190 LBS | TEMPERATURE: 98.3 F | RESPIRATION RATE: 16 BRPM | BODY MASS INDEX: 25.18 KG/M2 | OXYGEN SATURATION: 96 % | DIASTOLIC BLOOD PRESSURE: 85 MMHG | HEART RATE: 79 BPM | HEIGHT: 73 IN | SYSTOLIC BLOOD PRESSURE: 153 MMHG

## 2025-04-03 PROBLEM — F20.9 SCHIZOPHRENIA: Status: ACTIVE | Noted: 2025-04-03

## 2025-04-03 LAB
AMPHETAMINES UR QL SCN: NORMAL
BARBITURATES UR QL SCN: NORMAL
BENZODIAZ UR QL SCN: NORMAL
BZE UR QL SCN: NORMAL
CANNABINOIDS UR QL SCN: NORMAL
FENTANYL+NORFENTANYL UR QL SCN: NORMAL
METHADONE UR QL SCN: NORMAL
OPIATES UR QL SCN: NORMAL
OXYCODONE+OXYMORPHONE UR QL SCN: NORMAL
PCP UR QL SCN: NORMAL

## 2025-04-03 PROCEDURE — G0378 HOSPITAL OBSERVATION PER HR: HCPCS

## 2025-04-03 PROCEDURE — 80307 DRUG TEST PRSMV CHEM ANLYZR: CPT

## 2025-04-03 NOTE — ED PROVIDER NOTES
"HPI   Chief Complaint   Patient presents with    Psychiatric Evaluation       HPI    Gareth Kaminski is a 59-year-old with history of schizophrenia presenting the ED for psychiatric evaluation patient states \"I need to go to heaven from here.\"  When asked why he needs to go to heaven the patient responded with \"I was born in a private but did not help anybody\" and \"God told me if I did not help anyone when I turn 59 and need to go to heaven.\"  Patient states he does not want to go to heaven and he does not want to kill himself.  Patient states he does not want to harm other people. Patient denies visual and auditory hallucinations.  Patient denies chest pain, shortness of breath, abdominal pain, nausea, vomiting, fevers, body aches, chills.    Patient History   No past medical history on file.  No past surgical history on file.  No family history on file.  Social History     Tobacco Use    Smoking status: Every Day     Types: Cigarettes    Smokeless tobacco: Never   Substance Use Topics    Alcohol use: Not on file    Drug use: Not on file       Physical Exam   ED Triage Vitals [04/02/25 1929]   Temperature Heart Rate Respirations BP   36.6 °C (97.9 °F) 81 17 (!) 165/111      Pulse Ox Temp Source Heart Rate Source Patient Position   96 % Temporal Monitor --      BP Location FiO2 (%)     -- --       Physical Exam  Vitals and nursing note reviewed.   Constitutional:       Appearance: Normal appearance.   Cardiovascular:      Rate and Rhythm: Normal rate and regular rhythm.      Heart sounds: Normal heart sounds. No murmur heard.     No gallop.   Pulmonary:      Effort: Pulmonary effort is normal. No respiratory distress.      Breath sounds: Normal breath sounds. No stridor. No wheezing, rhonchi or rales.   Abdominal:      General: Abdomen is flat. Bowel sounds are normal. There is no distension.      Palpations: Abdomen is soft. There is no mass.      Tenderness: There is no abdominal tenderness. There is no guarding or " "rebound.      Hernia: No hernia is present.   Musculoskeletal:      Right lower leg: No edema.      Left lower leg: No edema.   Skin:     General: Skin is warm and dry.   Neurological:      General: No focal deficit present.      Mental Status: He is alert and oriented to person, place, and time.   Psychiatric:         Attention and Perception: He perceives visual hallucinations. He does not perceive auditory hallucinations.         Mood and Affect: Mood normal.         Speech: Speech normal.         Behavior: Behavior normal. Behavior is cooperative.         Thought Content: Thought content is not paranoid. Thought content does not include homicidal or suicidal ideation. Thought content does not include homicidal or suicidal plan.           ED Course & MDM   ED Course as of 04/02/25 2336 Wed Apr 02, 2025 2227 Electrocardiogram, 12-lead  Interpreted by me.  4/2/2025 at 2135.  Sinus rhythm 76 bpm.  , QRS 94, QTc 414.  No ST elevation.  T wave inversions inferior leads. [MC]      ED Course User Index  [MC] Jesse Hendrickson MD         Diagnoses as of 04/02/25 2336   Encounter for psychiatric assessment                 No data recorded     [place] Coma Scale Score: 15 (04/02/25 2155 : Malka La RN)                           Medical Decision Making  This is a 59-year-old male presenting the ED for psychiatric evaluation.  Patient states \"I need to go to [facility] from here\".  However patient denies current suicidal and homicidal ideation.  EPAT was consulted for further evaluation.  CBC, CMP, tox screen, EKG were obtained for medical clearance.  Labs are unremarkable.  Patient is medically cleared    Patient was discussed and staffed with Dr. Hendrickson  Patient was discussed in signout to the [facility] emergency medicine provider.  EPAT evaluation is pending at this time.    Procedure  Procedures     Ashkan Howe PA-C  04/02/25 2336    "
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
## 2025-04-21 ENCOUNTER — HOSPITAL ENCOUNTER (EMERGENCY)
Facility: HOSPITAL | Age: 60
Discharge: LEFT BEFORE DISCHARGE | End: 2025-04-21
Payer: COMMERCIAL

## 2025-04-21 VITALS
OXYGEN SATURATION: 98 % | DIASTOLIC BLOOD PRESSURE: 103 MMHG | WEIGHT: 190 LBS | HEIGHT: 73 IN | HEART RATE: 68 BPM | RESPIRATION RATE: 16 BRPM | BODY MASS INDEX: 25.18 KG/M2 | SYSTOLIC BLOOD PRESSURE: 157 MMHG | TEMPERATURE: 98.4 F

## 2025-04-21 LAB — GLUCOSE BLD MANUAL STRIP-MCNC: 98 MG/DL (ref 74–99)

## 2025-04-21 PROCEDURE — 4500999001 HC ED NO CHARGE

## 2025-04-21 PROCEDURE — 99281 EMR DPT VST MAYX REQ PHY/QHP: CPT

## 2025-04-21 PROCEDURE — 82947 ASSAY GLUCOSE BLOOD QUANT: CPT

## 2025-04-21 NOTE — ED TRIAGE NOTES
"Pt is a 60 YO M c/c of \"diabetic check\". Pt states that he \"feels like his blood sugar is too low\". Pt states that he was \"drinking some water and felt like he was going to fall out\". Pt denies any CP, HA, SOB, N/V/D. BGL is 98. Pt is hypertensive . Pt is noncompliant with medication for it.    "

## 2025-05-18 ENCOUNTER — HOSPITAL ENCOUNTER (EMERGENCY)
Facility: HOSPITAL | Age: 60
Discharge: HOME | End: 2025-05-18
Payer: COMMERCIAL

## 2025-05-18 VITALS
TEMPERATURE: 98 F | HEART RATE: 76 BPM | OXYGEN SATURATION: 95 % | DIASTOLIC BLOOD PRESSURE: 108 MMHG | SYSTOLIC BLOOD PRESSURE: 169 MMHG | RESPIRATION RATE: 18 BRPM

## 2025-05-18 DIAGNOSIS — Z59.00 HOMELESSNESS: Primary | ICD-10-CM

## 2025-05-18 PROCEDURE — 99281 EMR DPT VST MAYX REQ PHY/QHP: CPT

## 2025-05-18 PROCEDURE — 99283 EMERGENCY DEPT VISIT LOW MDM: CPT

## 2025-05-18 SDOH — ECONOMIC STABILITY - HOUSING INSECURITY: HOMELESSNESS UNSPECIFIED: Z59.00

## 2025-05-18 NOTE — ED PROVIDER NOTES
History of Present Illness     History provided by: Patient  Limitations to History: None  External Records Reviewed with Brief Summary: None    HPI:  Gareth Kaminski is a 59 y.o. male with history of schizophrenia and hypertension presents to the emergency department requesting to talk with the  due to him being homeless.  He denies pain, suicidal ideations or homicidal ideations.    Physical Exam   Triage vitals:  T 36.7 °C (98 °F)  HR 76  BP (!) 169/108  RR 18  O2 95 % None (Room air)    Physical Exam     Medical Decision Making & ED Course   Medical Decision Makin y.o. male who presents to the emergency department for concern of homelessness.  Upon exam, patient is alert and oriented to person, place and time.  No neurological deficits.  Speech is clear and fluent.  Does not appear to be under the influence.  He is calm and cooperative.    I educated patient the  is not on duty at this time.  Educated him on local shelters that are available.  Explained that his blood pressure was elevated, offered a dose of his home dose of amlodipine, he declined, stated he does not take his blood pressure medications.    Patient declined to have blood pressure reassessed.  Turkey sandwich and ginger ale provided.  Patient educated to follow-up with his primary care provider.    Social Determinants of Health which Significantly Impact Care: None identified     EKG Independent Interpretation: EKG not obtained    Independent Result Review and Interpretation: None obtained    Chronic conditions affecting the patient's care: As documented above in [facility]    The patient was discussed with the following consultants/services: None    Care Considerations: As documented above in [facility]    ED Course:  Diagnoses as of 25 0159   Homelessness     Disposition   Discharge    Procedures   Procedures    Patient was seen independently    NAVJOT Germain-CNP  Emergency Medicine     Merced Garg  NAVJOT-CNP  05/18/25 0202

## 2025-05-22 ENCOUNTER — HOSPITAL ENCOUNTER (EMERGENCY)
Facility: HOSPITAL | Age: 60
Discharge: HOME | End: 2025-05-23
Payer: COMMERCIAL

## 2025-05-22 VITALS
DIASTOLIC BLOOD PRESSURE: 74 MMHG | RESPIRATION RATE: 18 BRPM | WEIGHT: 200 LBS | HEART RATE: 91 BPM | TEMPERATURE: 97.3 F | SYSTOLIC BLOOD PRESSURE: 124 MMHG | HEIGHT: 73 IN | OXYGEN SATURATION: 97 % | BODY MASS INDEX: 26.51 KG/M2

## 2025-05-22 LAB — GLUCOSE BLD MANUAL STRIP-MCNC: 123 MG/DL (ref 74–99)

## 2025-05-22 PROCEDURE — 4500999001 HC ED NO CHARGE

## 2025-05-22 PROCEDURE — 99281 EMR DPT VST MAYX REQ PHY/QHP: CPT

## 2025-05-22 PROCEDURE — 82947 ASSAY GLUCOSE BLOOD QUANT: CPT

## 2025-05-22 ASSESSMENT — COLUMBIA-SUICIDE SEVERITY RATING SCALE - C-SSRS
1. IN THE PAST MONTH, HAVE YOU WISHED YOU WERE DEAD OR WISHED YOU COULD GO TO SLEEP AND NOT WAKE UP?: NO
6. HAVE YOU EVER DONE ANYTHING, STARTED TO DO ANYTHING, OR PREPARED TO DO ANYTHING TO END YOUR LIFE?: NO
2. HAVE YOU ACTUALLY HAD ANY THOUGHTS OF KILLING YOURSELF?: NO

## 2025-05-22 ASSESSMENT — PAIN - FUNCTIONAL ASSESSMENT: PAIN_FUNCTIONAL_ASSESSMENT: 0-10

## 2025-05-22 ASSESSMENT — PAIN SCALES - GENERAL: PAINLEVEL_OUTOF10: 0 - NO PAIN

## 2025-05-23 NOTE — ED TRIAGE NOTES
Patient has multiple wrist bands on from other hospitals, was at [facility] today, reports needing his blood sugar checked.    After checking patient asked if he was diabetic and if he could have a turkey sandwich. Turkey sandwich and gingerale provided.

## 2025-05-31 ENCOUNTER — HOSPITAL ENCOUNTER (EMERGENCY)
Facility: HOSPITAL | Age: 60
Discharge: HOME | End: 2025-06-01
Attending: EMERGENCY MEDICINE
Payer: COMMERCIAL

## 2025-05-31 ENCOUNTER — CLINICAL SUPPORT (OUTPATIENT)
Dept: EMERGENCY MEDICINE | Facility: HOSPITAL | Age: 60
End: 2025-05-31
Payer: COMMERCIAL

## 2025-05-31 DIAGNOSIS — Z65.9 CONCERNED ABOUT HAVING SOCIAL PROBLEM: Primary | ICD-10-CM

## 2025-05-31 LAB
ALBUMIN SERPL BCP-MCNC: 3.9 G/DL (ref 3.4–5)
ALP SERPL-CCNC: 59 U/L (ref 33–120)
ALT SERPL W P-5'-P-CCNC: 12 U/L (ref 10–52)
AMPHETAMINES UR QL SCN: NORMAL
ANION GAP SERPL CALC-SCNC: 10 MMOL/L (ref 10–20)
APAP SERPL-MCNC: <10 UG/ML (ref ?–30)
APPEARANCE UR: CLEAR
AST SERPL W P-5'-P-CCNC: 20 U/L (ref 9–39)
ATRIAL RATE: 77 BPM
BARBITURATES UR QL SCN: NORMAL
BASOPHILS # BLD AUTO: 0.04 X10*3/UL (ref 0–0.1)
BASOPHILS NFR BLD AUTO: 0.7 %
BENZODIAZ UR QL SCN: NORMAL
BILIRUB SERPL-MCNC: 0.3 MG/DL (ref 0–1.2)
BILIRUB UR STRIP.AUTO-MCNC: NEGATIVE MG/DL
BUN SERPL-MCNC: 16 MG/DL (ref 6–23)
BZE UR QL SCN: NORMAL
CALCIUM SERPL-MCNC: 9.2 MG/DL (ref 8.6–10.6)
CANNABINOIDS UR QL SCN: NORMAL
CHLORIDE SERPL-SCNC: 103 MMOL/L (ref 98–107)
CO2 SERPL-SCNC: 29 MMOL/L (ref 21–32)
COLOR UR: YELLOW
CREAT SERPL-MCNC: 1.06 MG/DL (ref 0.5–1.3)
EGFRCR SERPLBLD CKD-EPI 2021: 81 ML/MIN/1.73M*2
EOSINOPHIL # BLD AUTO: 0.23 X10*3/UL (ref 0–0.7)
EOSINOPHIL NFR BLD AUTO: 4.2 %
ERYTHROCYTE [DISTWIDTH] IN BLOOD BY AUTOMATED COUNT: 12.2 % (ref 11.5–14.5)
ETHANOL SERPL-MCNC: <10 MG/DL
FENTANYL+NORFENTANYL UR QL SCN: NORMAL
GLUCOSE SERPL-MCNC: 88 MG/DL (ref 74–99)
GLUCOSE UR STRIP.AUTO-MCNC: NORMAL MG/DL
HCT VFR BLD AUTO: 38.1 % (ref 41–52)
HGB BLD-MCNC: 12.5 G/DL (ref 13.5–17.5)
IMM GRANULOCYTES # BLD AUTO: 0.01 X10*3/UL (ref 0–0.7)
IMM GRANULOCYTES NFR BLD AUTO: 0.2 % (ref 0–0.9)
KETONES UR STRIP.AUTO-MCNC: NEGATIVE MG/DL
LEUKOCYTE ESTERASE UR QL STRIP.AUTO: ABNORMAL
LYMPHOCYTES # BLD AUTO: 1.54 X10*3/UL (ref 1.2–4.8)
LYMPHOCYTES NFR BLD AUTO: 27.9 %
MCH RBC QN AUTO: 30.8 PG (ref 26–34)
MCHC RBC AUTO-ENTMCNC: 32.8 G/DL (ref 32–36)
MCV RBC AUTO: 94 FL (ref 80–100)
METHADONE UR QL SCN: NORMAL
MONOCYTES # BLD AUTO: 0.53 X10*3/UL (ref 0.1–1)
MONOCYTES NFR BLD AUTO: 9.6 %
MUCOUS THREADS #/AREA URNS AUTO: ABNORMAL /LPF
NEUTROPHILS # BLD AUTO: 3.17 X10*3/UL (ref 1.2–7.7)
NEUTROPHILS NFR BLD AUTO: 57.4 %
NITRITE UR QL STRIP.AUTO: NEGATIVE
NRBC BLD-RTO: 0 /100 WBCS (ref 0–0)
OPIATES UR QL SCN: NORMAL
OXYCODONE+OXYMORPHONE UR QL SCN: NORMAL
P AXIS: 77 DEGREES
P OFFSET: 174 MS
P ONSET: 114 MS
PCP UR QL SCN: NORMAL
PH UR STRIP.AUTO: 6 [PH]
PLATELET # BLD AUTO: 225 X10*3/UL (ref 150–450)
POTASSIUM SERPL-SCNC: 3.7 MMOL/L (ref 3.5–5.3)
PR INTERVAL: 222 MS
PROT SERPL-MCNC: 7.4 G/DL (ref 6.4–8.2)
PROT UR STRIP.AUTO-MCNC: ABNORMAL MG/DL
Q ONSET: 225 MS
QRS COUNT: 12 BEATS
QRS DURATION: 88 MS
QT INTERVAL: 374 MS
QTC CALCULATION(BAZETT): 423 MS
QTC FREDERICIA: 406 MS
R AXIS: 54 DEGREES
RBC # BLD AUTO: 4.06 X10*6/UL (ref 4.5–5.9)
RBC # UR STRIP.AUTO: NEGATIVE MG/DL
RBC #/AREA URNS AUTO: ABNORMAL /HPF
SALICYLATES SERPL-MCNC: <3 MG/DL (ref ?–20)
SODIUM SERPL-SCNC: 138 MMOL/L (ref 136–145)
SP GR UR STRIP.AUTO: 1.03
T AXIS: 4 DEGREES
T OFFSET: 412 MS
UROBILINOGEN UR STRIP.AUTO-MCNC: NORMAL MG/DL
VENTRICULAR RATE: 77 BPM
WBC # BLD AUTO: 5.5 X10*3/UL (ref 4.4–11.3)
WBC #/AREA URNS AUTO: ABNORMAL /HPF

## 2025-05-31 PROCEDURE — 99284 EMERGENCY DEPT VISIT MOD MDM: CPT | Performed by: EMERGENCY MEDICINE

## 2025-05-31 PROCEDURE — 80320 DRUG SCREEN QUANTALCOHOLS: CPT

## 2025-05-31 PROCEDURE — 36415 COLL VENOUS BLD VENIPUNCTURE: CPT

## 2025-05-31 PROCEDURE — 80307 DRUG TEST PRSMV CHEM ANLYZR: CPT

## 2025-05-31 PROCEDURE — 80143 DRUG ASSAY ACETAMINOPHEN: CPT

## 2025-05-31 PROCEDURE — 85025 COMPLETE CBC W/AUTO DIFF WBC: CPT

## 2025-05-31 PROCEDURE — 80053 COMPREHEN METABOLIC PANEL: CPT

## 2025-05-31 PROCEDURE — 81001 URINALYSIS AUTO W/SCOPE: CPT | Mod: 59

## 2025-05-31 PROCEDURE — 93005 ELECTROCARDIOGRAM TRACING: CPT

## 2025-05-31 PROCEDURE — 99285 EMERGENCY DEPT VISIT HI MDM: CPT | Performed by: EMERGENCY MEDICINE

## 2025-05-31 PROCEDURE — 93010 ELECTROCARDIOGRAM REPORT: CPT | Performed by: EMERGENCY MEDICINE

## 2025-05-31 SDOH — HEALTH STABILITY: MENTAL HEALTH: HAVE YOU EVER DONE ANYTHING, STARTED TO DO ANYTHING, OR PREPARED TO DO ANYTHING TO END YOUR LIFE?: NO

## 2025-05-31 SDOH — HEALTH STABILITY: MENTAL HEALTH: BEHAVIORAL HEALTH(WDL): EXCEPTIONS TO WDL

## 2025-05-31 SDOH — HEALTH STABILITY: MENTAL HEALTH: HAVE YOU WISHED YOU WERE DEAD OR WISHED YOU COULD GO TO SLEEP AND NOT WAKE UP?: NO

## 2025-05-31 SDOH — HEALTH STABILITY: MENTAL HEALTH: BEHAVIORS/MOOD: CALM;COOPERATIVE;PARANOID;RESTLESS

## 2025-05-31 SDOH — HEALTH STABILITY: MENTAL HEALTH: HALLUCINATION: AUDITORY

## 2025-05-31 SDOH — HEALTH STABILITY: MENTAL HEALTH: CONTENT: RELIGIOSITY;DELUSIONS

## 2025-05-31 SDOH — HEALTH STABILITY: MENTAL HEALTH: DELUSIONS: RELIGIOUS

## 2025-05-31 SDOH — HEALTH STABILITY: MENTAL HEALTH: SUICIDE ASSESSMENT: ADULT (C-SSRS)

## 2025-05-31 SDOH — HEALTH STABILITY: MENTAL HEALTH: NEEDS EXPRESSED: SPIRITUAL

## 2025-05-31 SDOH — HEALTH STABILITY: MENTAL HEALTH: HAVE YOU ACTUALLY HAD ANY THOUGHTS OF KILLING YOURSELF?: NO

## 2025-05-31 SDOH — HEALTH STABILITY: MENTAL HEALTH: BEHAVIORS/MOOD: CALM;COOPERATIVE;PARANOID

## 2025-05-31 SDOH — HEALTH STABILITY: MENTAL HEALTH: NEEDS EXPRESSED: FINANCIAL;PHYSICAL;SPIRITUAL

## 2025-05-31 SDOH — HEALTH STABILITY: MENTAL HEALTH: SLEEP PATTERN: DIFFICULTY FALLING ASLEEP;RESTLESSNESS

## 2025-05-31 ASSESSMENT — LIFESTYLE VARIABLES
EVER HAD A DRINK FIRST THING IN THE MORNING TO STEADY YOUR NERVES TO GET RID OF A HANGOVER: NO
HAVE YOU EVER FELT YOU SHOULD CUT DOWN ON YOUR DRINKING: NO
HAVE PEOPLE ANNOYED YOU BY CRITICIZING YOUR DRINKING: NO
TOTAL SCORE: 0
EVER FELT BAD OR GUILTY ABOUT YOUR DRINKING: NO

## 2025-05-31 ASSESSMENT — PAIN - FUNCTIONAL ASSESSMENT
PAIN_FUNCTIONAL_ASSESSMENT: 0-10
PAIN_FUNCTIONAL_ASSESSMENT: 0-10

## 2025-05-31 ASSESSMENT — PAIN SCALES - GENERAL: PAINLEVEL_OUTOF10: 0 - NO PAIN

## 2025-05-31 NOTE — ED PROVIDER NOTES
Emergency Department Provider Note        History of Present Illness     History provided by: Patient  Limitations to History: Mental Illness    HPI:  Gareth Kaminski is a 59 y.o. male with PMHx of schizophrenia and HTN presenting to the emergency department today for evaluation by social work stating that he is homeless and he needs placement.  We discussed with the patient, states that he was kicked out of [facility] shelter, however did not disclose details as to why.  Patient states that he was told by God he is a prophet, and they need to follow his wishes which she was told to come to [facility], then after that he will go to [facility].  At this time patient denies any other physical complaints including headache, vision changes, numbness/tingling, chest pain, shortness of breath, abdominal pain, nausea/vomiting.  Continues to talk about going to heaven, however denies suicidal ideation, homicidal ideation, auditory or visual hallucinations.  When asked about if he has been taking any of his schizophrenia medication, states that he does not have schizophrenia.  Patient notes that he reads his Bible and that helps him cope with situations.    Physical Exam   Triage vitals:  T 36.5 °C (97.7 °F)  HR 85  BP (!) 153/100  RR 18  O2 98 % None (Room air)    General: Awake, alert, in no acute distress  Eyes: Gaze conjugate.  No scleral icterus or injection  HENT: Normo-cephalic, atraumatic. No stridor  CV: Regular rate, regular rhythm. Radial pulses 2+ bilaterally  Resp: Breathing non-labored, speaking in full sentences.  Clear to auscultation bilaterally  GI: Soft, non-distended, non-tender. No rebound or guarding.  MSK/Extremities: No gross bony deformities. Moving all extremities  Skin: Warm. Appropriate color  Neuro: Alert. Oriented. Face symmetric. Speech is fluent.  Gross strength and sensation intact in b/l UE and LEs  Psych: Appropriate mood and affect, denies SI/HI, AH/VH    Medical Decision Making & ED  Course   Medical Decision Makin y.o. male with PMHx of schizophrenia and hypertension presenting to the emergency department for evaluation by social work due to homelessness, states he was kicked out of [address] [place] [address] today for undisclosed reason.  On arrival, vital signs stable, noted for some mild hypertension 153/100 otherwise afebrile.  Patient is alert and oriented, speech is clear, nonpressured, patient is calm and cooperative.  Although patient does have some abnormal thought process, continually states that he needs to go to heaven and he is a prophet of God, and God told him to come to [facility] today.  Given patient's prior history of schizophrenia, concern for decompensated schizophrenia. This patient presents with symptoms consistent with an underlying psychiatric disorder, most likely schizophrenia. Presentation not consistent with acute organic causes to include delirium, dementia or drug induced disorders (acute ingestions or withdrawal; no evidence of toxidrome). Given the H&P, I suspect this patient is gravely disabled from decompensated schizophrenia and workup was initiated. EPAT was consulted and continued patient’s hold. Patient was medically cleared.  Additionally social work consulted and discussed with them, who reached out to [address] [place] [address] who states he is not a 24-hour banned from their facility, patient will not be allowed to return until  at 5 PM.  Additionally social work reached out to patient's legal guardian Lux mcmullen, his brother, who did not answer, they are requesting callback.  Per social work's note,  He is linked with [facility] ACT team. Per MRN, patient is considering group home placement at this time but RSS application is pending. Last met with ACT Team .  At this time patient is not acutely psychotic, agitated or altered, necessitating an medications.  Will continue to monitor.    ----  Social Determinants of Health which Significantly  Impact Care: Housing insecurity, Poor housing conditions, and Mental health disorder The following actions were taken to address these social determinants: Patient offered evaluation by Social Work, Patient given list of homeless shelters, and Patient given list of psychiatric resources    EKG Independent Interpretation: EKG interpreted by myself. Please see ED Course for full interpretation.    Independent Result Review and Interpretation: Relevant laboratory and radiographic results were reviewed and independently interpreted by myself.  As necessary, they are commented on in the ED Course.    Chronic conditions affecting the patient's care: As documented above in MDM    The patient was discussed with the following consultants/services: Social work and EPAT    Care Considerations: As documented above in MDM    ED Course:  ED Course as of 06/02/25 0709   Sat May 31, 2025   2231 EKG at 2016 shows normal sinus rhythm, with first-degree AV block, AZ interval 222, normal axis, nonspecific T wave abnormality in lead III, aVF otherwise no acute ST segment changes. [MAIDA]   2232 Drug screen is negative.  UA shows 250 leuk esterase, and 11-20 WBCs, no bacteria present, no nitrates.  Low suspicion for acute UTI. [MAIDA]   [place] Jun 01, 2025   0120 CBC shows no leukocytosis, H&H stable, normal platelets.  CMP unremarkable.  UA shows 250 leukoesterase with 11-20 WBCs, no bacteria, no concern or suspicion for acute UTI patient has been asymptomatic. [MAIDA]   0305 Per RN, patient is refusing to have vitals reassessed at this time.  Patient is resting comfortably in bed, no acute concerns at this time for instability or abnormality of vital signs. [MAIDA]      ED Course User Index  [MAIDA] Clemente MINA Jose, DO         Diagnoses as of 06/02/25 0709   Concerned about having social problem     Disposition   Patient was signed out to Dr. Santos at 0700 pending completion of their work-up and evaluation by EPAT and social work.  Please see the next  provider's transition of care note for the remainder of the patient's care.     Procedures   Procedures    Patient seen and discussed with ED attending physician.    Clemente Garcia DO  Emergency Medicine     Clemente Garcia DO  Resident  06/02/25 0709

## 2025-05-31 NOTE — ED TRIAGE NOTES
Pt requesting help. RN initially having hard time understanding patient. Pt requesting social work for placement somewhere and get help. Pt does have history of schizophrenia. Denies SI/HI/AH/VH. Pt not making sense. Calm and cooperative in triage.

## 2025-05-31 NOTE — PROGRESS NOTES
SW consulted to assist with homeless patient. Sue reviewed chart. Patient has legal guardian Lux Kaminski - brother [phone] left message requesting call back. He is linked with [facility] ACT team. Per MRN, patient is considering group home placement at this time but RSS application is pending. Last met with ACT Team 5/23.     Patient currently ban from the shelter for 24 hours. He can return at 5pm 6/1.

## 2025-06-01 VITALS
TEMPERATURE: 98.2 F | HEART RATE: 73 BPM | DIASTOLIC BLOOD PRESSURE: 97 MMHG | RESPIRATION RATE: 16 BRPM | OXYGEN SATURATION: 98 % | HEIGHT: 73 IN | BODY MASS INDEX: 23.86 KG/M2 | WEIGHT: 180 LBS | SYSTOLIC BLOOD PRESSURE: 160 MMHG

## 2025-06-01 SDOH — HEALTH STABILITY: MENTAL HEALTH: DEPRESSION SYMPTOMS: NO PROBLEMS REPORTED OR OBSERVED.

## 2025-06-01 SDOH — HEALTH STABILITY: MENTAL HEALTH: SUICIDAL BEHAVIOR (LIFETIME): NO

## 2025-06-01 SDOH — HEALTH STABILITY: MENTAL HEALTH: ANXIETY SYMPTOMS: NO PROBLEMS REPORTED OR OBSERVED.

## 2025-06-01 SDOH — HEALTH STABILITY: MENTAL HEALTH: WISH TO BE DEAD (PAST 1 MONTH): NO

## 2025-06-01 SDOH — HEALTH STABILITY: MENTAL HEALTH: NON-SPECIFIC ACTIVE SUICIDAL THOUGHTS (PAST 1 MONTH): NO

## 2025-06-01 SDOH — ECONOMIC STABILITY: HOUSING INSECURITY: FEELS SAFE LIVING IN HOME: YES

## 2025-06-01 SDOH — HEALTH STABILITY: MENTAL HEALTH: HAVE YOU EVER TRIED TO KILL YOURSELF?: NO

## 2025-06-01 SDOH — HEALTH STABILITY: MENTAL HEALTH: IN THE PAST WEEK, HAVE YOU BEEN HAVING THOUGHTS ABOUT KILLING YOURSELF?: NO

## 2025-06-01 SDOH — ECONOMIC STABILITY: GENERAL: FINANCIAL CONCERNS: UNABLE TO MEET BASIC NEEDS

## 2025-06-01 SDOH — HEALTH STABILITY: MENTAL HEALTH: IN THE PAST FEW WEEKS, HAVE YOU FELT THAT YOU OR YOUR FAMILY WOULD BE BETTER OFF IF YOU WERE DEAD?: NO

## 2025-06-01 SDOH — HEALTH STABILITY: MENTAL HEALTH: ARE YOU HAVING THOUGHTS OF KILLING YOURSELF RIGHT NOW?: NO

## 2025-06-01 SDOH — HEALTH STABILITY: MENTAL HEALTH: IN THE PAST FEW WEEKS, HAVE YOU WISHED YOU WERE DEAD?: NO

## 2025-06-01 ASSESSMENT — LIFESTYLE VARIABLES
SUBSTANCE_ABUSE_PAST_12_MONTHS: NO
PRESCIPTION_ABUSE_PAST_12_MONTHS: NO

## 2025-06-01 NOTE — PROGRESS NOTES
Social Work Note  I have had a discussion with the patient about warning signs that their condition is worsening, and they should consider returning to the ED and/or calling 911    The patient has identified appropriate internal coping strategies and has people and social settings that provide distraction and support.    The patient has a person who they can talk to in a crisis.  I have offered to contact this individual  No - patient has declined that I reach out.

## 2025-06-01 NOTE — ED PROVIDER NOTES
Transition of care note:  The patient was signed out to me from Dr. Garcia pending EPAT and social work consults.    Patient is a 59-year-old male who came to the ED for evaluation by social work after being [facility] with a 24-hour ban.  Past medical history significant for schizophrenia and hypertension currently not on any medication.    On independent assessment of the labs, CBC was within normal limits, no evidence of infection or acute anemia.  CMP was within normal limits, no evidence of electrolyte abnormalities, DENIZ, or liver dysfunction.  Tox panel is negative.  Urinalysis negative for UTI.    EPAT saw and assessed the patient.  They do not believe that the patient meets criteria or requires inpatient psychiatric hospitalization.  The patient is not suicidal or homicidal.  Social work has seen the patient, he can go back to his previous facility when the 24-hour ban is up at 5 PM.      Patient does not want any treatment for his schizophrenia.  He says that he will read his Bible, and that is enough to treat him. The patient was discharged and given return precautions.     ED Course as of 06/01/25 1034   Sat May 31, 2025   2231 EKG at 2016 shows normal sinus rhythm, with first-degree AV block, AL interval 222, normal axis, nonspecific T wave abnormality in lead III, aVF otherwise no acute ST segment changes. [MAIDA]   2232 Drug screen is negative.  UA shows 250 leuk esterase, and 11-20 WBCs, no bacteria present, no nitrates.  Low suspicion for acute UTI. [MAIDA]   Sun Jun 01, 2025   0120 CBC shows no leukocytosis, H&H stable, normal platelets.  CMP unremarkable.  UA shows 250 leukoesterase with 11-20 WBCs, no bacteria, no concern or suspicion for acute UTI patient has been asymptomatic. [MAIDA]   0305 Per RN, patient is refusing to have vitals reassessed at this time.  Patient is resting comfortably in bed, no acute concerns at this time for instability or abnormality of vital signs. [MAIDA]      ED  "Course User Index  [MAIDA] Clemente Garcia, DO         Diagnoses as of 06/01/25 1034   Concerned about having social problem      Visit Vitals  BP (!) 160/97 (BP Location: Left arm, Patient Position: Sitting)   Pulse 73   Temp 36.8 °C (98.2 °F) (Temporal)   Resp 16   Ht 1.854 m (6' 1\")   Wt 81.6 kg (180 lb)   SpO2 98%   BMI 23.75 kg/m²   Smoking Status Every Day   BSA 2.05 m²           Main Santos MD  Resident  06/01/25 1034    "

## 2025-06-01 NOTE — PROGRESS NOTES
"EPAT - Social Work Psychiatric Assessment    Arrival Details  Mode of Arrival: Ambulatory  Admission Source: Home  Admission Type: Voluntary  EPAT Assessment Start Date: 06/01/25  EPAT Assessment Start Time: 0920  Name of : [facility]    History of Present Illness  Admission Reason: Psychiatric evaluation  HPI: Patient, Gareth Kaminski, is a 59 year old male with history of schizophrenia. Patient presented to ED with complaint of psychiatric evaluation. Patient reportedly kicked out of [address] [place] [address] shelter and was told by God to come to . No report of suicidal ideation, homicidal ideation, or hallucinations noted in charting. EPAT consulted due to concerns for psychiatric decompensation. Patient's chart, community record, provider note, triage note, labs, and C-SSRS score reviewed. Patient's chart shows history of mental health diagnoses and outpatient care. No recent EPAT assessments or inpatient psychiatric hospitalizations noted. Patient's C-SSRS scored at \"no risk\" in triage. Patient declined collateral contact during assessment stating \"I have no one\".     Readmission Information   Readmission within 30 Days: Yes  Previous ED Visit Date and Reason : 05/22/2025, Medical  Previous Discharge Date and Location: 05/22/2025, [facility]  Factors Contributing to  Readmission Inpatient/ED (Team Perspective): Homeless, Transportation Issues  Readmission Factors Team Comments: Chronic mental health issues and homelessness.  Factors Contributing to Readmission (Patient/Family Perspective): Chronic mental health issues and homelessness.    Psychiatric Symptoms  Anxiety Symptoms: No problems reported or observed.  Depression Symptoms: No problems reported or observed.  Krys Symptoms: Pressured speech, No problems reported or observed.    Psychosis Symptoms  Hallucination Type: No problems reported or observed.  Delusion Type: Grandeur, Orthodox    Additional Symptoms - Adult  Generalized Anxiety Disorder: " Difficulity concentrating  Obsessive Compulsive Disorder: No problems reported or observed.  Panic Attack: No problems reported or observed.  Post Traumatic Stress Disorder: No problems reported or observed.  Delirium: No problems reported or observed.  Review of Symptoms Comments: Patient discussed recently being kicked out of [facility] and coming to ED for help. Patient denied acute changes to mood, appetite, sleeping, and ADLs recently. Patient denied active hallucinations. Patient denied homicidal ideation and suicidal ideation. Patient denied history of suicide attempts. Patient appeared somewhat disorganized and tangential during assessment but able to advocate for needs. Patient did not appear overtly manic or engaged with internal stimulation during assessment. Patient appeared religiously preoccupied during assessment. Patient making statements about being a prophet and going to heaven after ED visit.    Past Psychiatric History/Meds/Treatments  Past Psychiatric History: Patient has history of schizophrenia.  Past Psychiatric Meds/Treatments: Patient denied taking medications for symptoms currently. Patient did not discuss history of inpatient psychiatric hospitalizations and no recent noted in chart.  Past Violence/Victimization History: Unreported    Current Mental Health Contacts   Name/Phone Number: Through [facility]   Last Appointment Date: 05/05/2025  Provider Name/Phone Number: [facility]  Provider Last Appointment Date: 05/05/2025    Support System: Community, Friends    Living Arrangement: Homeless    Home Safety  Feels Safe Living in Home: Yes  Potentially Unsafe Housing Conditions: Unable to Assess  Home Safety : Patient reportedly homeless and living at [facility]. Patient discussed feeling relatively safe at the facility.    Income Information  Employment Status for: Patient  Employment Status: Disabled  Income Source:  Disability  Current/Previous Occupation: Unable to Assess  Income/Expense Information: Expenses exceed income  Financial Concerns: Unable to Meet Basic Needs  Who Manages Finances if Patient Unable: Unreported  Employment/ Finance Comments: Patient did not discuss employment or financial issues.    Miltary Service/Education History  Current or Previous  Service: None   Experience: Other (Comment) (Unreported)  Education Level: Other (Comment) (Did not assess)  History of Learning Problems: No  History of School Behavior Problems: No  School History: Patient did not discuss school history or learning issues.    Social/Cultural History  Social History: Patient is a 59 year old black male with brown skin, brown/gray hair, wearing hospital gear. Patient appeared moderately groomed and close to stated age.  Cultural Requests During Hospitalization: Unreported  Spiritual Requests During Hospitalization: Unreported  Important Activities: Social    Legal  Legal Considerations: Patient/  for Healthcare Needs  Assistance with Managing/Advocating Healthcare Needs: Legal Guardian  Criminal Activity/ Legal Involvement Pertinent to Current Situation/ Hospitalization: Unreported  Legal Concerns: Unreported  Legal Comments: Unreported    Drug Screening  Have you used any substances (canabis, cocaine, heroin, hallucinogens, inhalants, etc.) in the past 12 months?: No  Have you used any prescription drugs other than prescribed in the past 12 months?: No  Is a toxicology screen needed?: Yes    Stage of Change  Stage of Change: Precontemplation  History of Treatment: Other (Comment) (Unreported)  Type of Treatment Offered: AA/NA meeting resource  Treatment Offered: Declined  Duration of Substance Use: Unreported  Frequency of Substance Use: Unreported  Age of First Substance Use: Unreported    Behavioral Health  Behavioral Health(WDL): Exceptions to WDL  Behaviors/Mood: Calm, Cooperative,  Hyper-verbal  Affect: Appropriate to circumstances  Parent/Guardian/Significant Other Involvement: No involvement  Family Behaviors: Unable to assess  Visitor Behaviors: Unable to assess  Needs Expressed: Spiritual  Emotional Support Given: Reassure    Orientation  Orientation Level: Disoriented to situation    General Appearance  Motor Activity: Unremarkable  Speech Pattern: Pressured, Speech impairments (Mumbling)  General Attitude: Cooperative, Pleasant  Appearance/Hygiene: Poor hygiene    Thought Process  Coherency: Loose associations, Tangential  Content: Preoccupation, Religiosity  Delusions: Quaker  Perception: Derealization  Hallucination: None  Judgment/Insight: Limited  Confusion: None  Cognition: Poor attention/concentration, Follows commands    Sleep Pattern  Sleep Pattern: Restlessness    Risk Factors  Self Harm/Suicidal Ideation Plan: Patient denied active suicidal ideation and history of attempts.  Previous Self Harm/Suicidal Plans: Patient denied history of suicide attempts.  Risk Factors: Lower socioeconomic status, Major mental illness, Male  Description of Thoughts/Ideas Leaving Unit Now: Patient denied active suicial ideation and reported feeling relatively safe at shelter.    Violence Risk Assessment  Assessment of Violence: None noted  Thoughts of Harm to Others: No    Ability to Assess Risk Screen  Risk Screen - Ability to Assess: Able to be screened  Ask Suicide-Screening Questions  1. In the past few weeks, have you wished you were dead?: No  2. In the past few weeks, have you felt that you or your family would be better off if you were dead?: No  3. In the past week, have you been having thoughts about killing yourself?: No  4. Have you ever tried to kill yourself?: No  5. Are you having thoughts of killing yourself right now?: No  Calculated Risk Score: No intervention is necessary  [place] Suicide Severity Rating Scale (Screener/Recent Self-Report)  1. Wish to be Dead (Past 1 Month):  "No  2. Non-Specific Active Suicidal Thoughts (Past 1 Month): No  6. Suicidal Behavior (Lifetime): No  Calculated C-SSRS Risk Score (Lifetime/Recent): No Risk Indicated  Step 1: Risk Factors  Current & Past Psychiatric Dx: Psychotic disorder  Presenting Symptoms: Psychosis  Family History: Other (Comment) (Unreported)  Precipitants/Stressors: Inadequate social supports, Triggering events leading to humiliation, shame, and/or despair (e.g. loss of relationship, financial or health status) (real or anticipated)  Change in Treatment: Non-compliant or not receiving treatment  Access to Lethal Methods : No  Step 2: Protective Factors   Protective Factors Internal: Frustration tolerance, Lutheran beliefs, Identifies reasons for living  Protective Factors External: Cultural, spiritual and/or moral attitudes against suicide, Supportive social network or family or friends, Positive therapeutic relationships  Step 3: Suicidal Ideation Intensity  Most Severe Suicidal Ideation Identified: Patient denied active suicidal ideation and recent attempts.  How Many Times Have You Had These Thoughts: Less than once a week  When You Have the Thoughts How Long do They Last : Fleeting - few seconds or minutes  Could/Can You Stop Thinking About Killing Yourself or Wanting to Die if You Want to: Easily able to control thoughts  Are There Things - Anyone or Anything - That Stopped You From Wanting to Die or Acting on: Deterrents definitely stopped you from attempting suicide  What Sort of Reasons Did You Have For Thinking About Wanting to Die or Killing Yourself: Does not apply  Total Score: 4  Step 5: Documentation  Risk Level: Low suicide risk    Psychiatric Impression and Plan of Care  Assessment and Plan: Patient, Gareth Singh, is a 59 year old male with history of schizophrenia. Patient presented to ED with complaint of psychiatric evaluation. Patient discussed reason for ED visit stating \"I'm homeless. I was having problems at [facility]. " "They put me out.\" Patient had quiet and mumbled speaking voice during assessment which challenged information gathering. Patient religiously preoccupied with belief patient is a prophet after God spoke to patient. Patient reportedly reading the bible to help manage adversity in the world. Patient reported no acute changes to appetite, sleeping, mood, and other ADLs recently. Patient denied suicidal ideation and history of attempts. Patient's C-SSRS scored at low risk due to no active ideation reported. Patient denied homicidal ideation. Patient denied hallucinations. Patient reported delusional belief that an ambulance would be taking patient to [facility] after ED discharge. Patient reported ability to come back from [facility]. Patient reported no need for physical body to die before going to [facility]. Patient reported \"I have to come back to play my trombone\" as reason to come back from [facility]. Patient denied recent substance use and did not require sober support with [facility]. Patient appears to have consistent [facility]. Patient able to identify supportive people in religiously connected friends. Patient able to voice reason for living being Roman Catholic connections. Patient appears to have poor baseline functioning with Roman Catholic preoccupation. Patient did not appear acutely decompensated related to mental health diagnoses. Patient low risk of harm to self and others. Patient Patient does not currently meet criteria for admission due to current baseline functioning. Patient encouraged to follow up with current providers, call 9-1-1, call crisis hotline, and return to ED if symptoms worsen. Patient recommended for discharge. Plan for care discussed with and approved by Dr. Santos.  Specific Resources Provided to Patient: Patient encouraged to follow up with current providers, call 9-1-1, call crisis hotline, and return to ED if symptoms worsen.  CM Notified: -  PHP/IOP Recommended: Not at this " time  Specific Information Provided for PHP/IOP: None at this time  Plan Comments: Diagnosis: Schizophrenia    Outcome/Disposition  Patient's Perception of Outcome Achieved: Accepting  Assessment, Recommendations and Risk Level Reviewed with: Dr Santos  Contact Name: Lux Campbell  Contact Number(s): [phone]  Contact Relationship: Guardian  EPAT Assessment Completed Date: 06/01/25  EPAT Assessment Completed Time: 1911  Patient Disposition: Home

## 2025-06-01 NOTE — DISCHARGE INSTRUCTIONS
Come to the ED if you have any other medical problems, fevers, chills, coughs, nausea, vomiting and diarrhea, and any other concerns.

## 2025-06-06 ENCOUNTER — HOSPITAL ENCOUNTER (EMERGENCY)
Facility: HOSPITAL | Age: 60
Discharge: OTHER INSTITUTION | End: 2025-06-06
Payer: COMMERCIAL

## 2025-06-06 PROCEDURE — 4500999001 HC ED NO CHARGE

## 2025-06-11 ENCOUNTER — HOSPITAL ENCOUNTER (EMERGENCY)
Facility: HOSPITAL | Age: 60
Discharge: OTHER INSTITUTION | End: 2025-06-11
Payer: COMMERCIAL

## 2025-06-11 VITALS
WEIGHT: 180 LBS | DIASTOLIC BLOOD PRESSURE: 94 MMHG | TEMPERATURE: 98.2 F | HEIGHT: 73 IN | OXYGEN SATURATION: 96 % | HEART RATE: 85 BPM | RESPIRATION RATE: 18 BRPM | SYSTOLIC BLOOD PRESSURE: 163 MMHG | BODY MASS INDEX: 23.86 KG/M2

## 2025-06-11 DIAGNOSIS — R51.9 ACUTE NONINTRACTABLE HEADACHE, UNSPECIFIED HEADACHE TYPE: Primary | ICD-10-CM

## 2025-06-11 PROCEDURE — 99282 EMERGENCY DEPT VISIT SF MDM: CPT

## 2025-06-11 PROCEDURE — 99283 EMERGENCY DEPT VISIT LOW MDM: CPT

## 2025-06-11 PROCEDURE — 2500000001 HC RX 250 WO HCPCS SELF ADMINISTERED DRUGS (ALT 637 FOR MEDICARE OP): Mod: SE

## 2025-06-11 RX ORDER — ACETAMINOPHEN 325 MG/1
975 TABLET ORAL ONCE
Status: COMPLETED | OUTPATIENT
Start: 2025-06-11 | End: 2025-06-11

## 2025-06-11 RX ADMIN — ACETAMINOPHEN 975 MG: 325 TABLET ORAL at 15:45

## 2025-06-11 NOTE — DISCHARGE INSTRUCTIONS
You were seen and evaluated in the ED today for headache.  Symptoms improved with food and Tylenol in the ED.  I did review your chart to attempt to find the medication you received in 2021 but did not locate the information you are looking for.  Please follow-up with your primary care doctor, referral placed.  Otherwise you may return to ED for any new or worsening symptoms including but not limited to fevers, chest pain, shortness of breath, weakness.

## 2025-06-11 NOTE — ED PROVIDER NOTES
History of Present Illness       History provided by: Patient  Limitations to History: None    HPI:  HPI   Patient is a 59-year-old male with a history of HTN, schizophrenia that presents to the ED for headache.  When I went to examine the patient he states that he is here to discuss a medication he received in 2021.  He is concerned that it gave him a headache and made him drowsy.  Denies current drowsiness but does admit to a current headache.  He states that it is throbbing and mild.  He has had headaches like this in the past.  No associated vision changes or fevers.  Has not taken any medication today.  While interviewing the patient he continues to deny current symptoms outside of headache and inquire about the medication he received in 2021.  He is asking for food and drink and to leave.  He denies any SI, HI, hallucinations.  States that he currently does have housing.      Physical Exam   Physical Exam     VS: As documented in the triage note and EMR flowsheet from this visit were reviewed.    Appearance: Alert, oriented, cooperative, in no acute distress.     Skin: Atraumatic. Warm, intact and dry. No lesions, rash, or petechiae.    Eyes: PERRLA, EOMs intact. Bilateral conjunctivae pink without injection or exudates. No hyphema or subconjunctival hemorrhage.    ENT: Hearing grossly intact. No drooling, dysphagia or trismus. Voice non-muffled.    Neck: Supple, without meningismus. No lymphadenopathy.     Pulmonary: Clear bilaterally with good chest wall excursion. No rales, rhonchi or wheezing. No accessory muscle use or stridor. Nonlabored breathing, no supplemental oxygen.     Cardiac: Normal S1, S2 without murmur. No reproducible chest tenderness.    Abdomen: Soft, nontender to palpation. Negative Potts's sign. No point tenderness at McBurney's point, negative Rovsing. No palpable organomegaly. No rebound or guarding.     Musculoskeletal: Spontaneously moving all extremities without limitation.   Extremities warm and well-perfused, capillary refill less than 2 seconds.  No lower extremity erythema, edema or increased warmth. Calves nontender and without palpable cords.     Neurological:  Cranial nerves II through XII are grossly intact, normal sensation, no weakness, no focal findings identified. Ambulating without assistance with steady gait, non-ataxic.    Psychiatric: Does not appear internally stimulated.             Medical Decision Making & ED Course     ED Course & MDM       Medical Decision Making:  Medical Decision Making     ----  Patient is a 59-year-old male with a history of HTN, schizophrenia that presents to the ED for headache. History obtained from patient. History and physical exam are as documented above.  Blood pressure elevated 163/94.  Patient does have a history of hypertension and is not compliant with medications.  All other vital signs are stable and within normal limits. Patient was seen independently.    Patient is resting comfortably in ED exam chair and does not appear in any acute distress.  He is well-known to the ED and appears to be at baseline.  He continuously inquires about a medication he received in 2021 while staying at [facility].  States that the medication gave him a headache and made him feel drowsy and he would like to know what it is.  I did discuss with patient that he has an extensive chart review and I was unable to locate this incident.  He continues to deny any current symptoms outside of a mild headache.  He does not describe it as the worst headache of his life or sudden onset.  Has had similar headaches in the past.  No associated fevers, chills, vision changes.  He is ambulating normally and with a non-ataxic gait.  Overall physical exam was benign.  No neurologic deficits.  Do not believe that further evaluation with imaging is necessary at this time.  He is treated in the ED with Tylenol and provided with sandwich and ginger ale.  Patient  "states he is feeling better and ready to go home.  He does currently have a place to stay and does not need resources.  Denies any current SI, HI, hallucinations.  He does not appear to be acutely paranoid or psychotic.  He is instructed to follow-up with primary care otherwise given strict return precautions.  Patient is in agreement this plan and discharged in stable condition.             Visit Vitals  BP (!) 163/94 (BP Location: Left arm, Patient Position: Sitting)   Pulse 85   Temp 36.8 °C (98.2 °F) (Tympanic)   Resp 18   Ht 1.854 m (6' 1\")   Wt 81.6 kg (180 lb)   SpO2 96%   BMI 23.75 kg/m²   Smoking Status Every Day   BSA 2.05 m²        Labs Reviewed - No data to display    No orders to display       ED Course:         Disposition   Discharged in stable condition.          Niecy Mcintyre PA-C  Emergency Medicine      Niecy Mcintyre PA-C  06/11/25 1605    "

## 2025-06-11 NOTE — ED TRIAGE NOTES
Pt presents to the ED for complaints of head pain after he received a medication the other day. Pt is very hard to understand in triage and is not giving a full story about what happened and is just stating it makes him tired. Pt has no other PMHX

## 2025-06-12 ENCOUNTER — HOSPITAL ENCOUNTER (EMERGENCY)
Facility: HOSPITAL | Age: 60
Discharge: HOME | End: 2025-06-12
Attending: EMERGENCY MEDICINE
Payer: COMMERCIAL

## 2025-06-12 VITALS
BODY MASS INDEX: 23.86 KG/M2 | SYSTOLIC BLOOD PRESSURE: 155 MMHG | OXYGEN SATURATION: 98 % | WEIGHT: 180 LBS | DIASTOLIC BLOOD PRESSURE: 91 MMHG | HEART RATE: 65 BPM | HEIGHT: 73 IN | RESPIRATION RATE: 18 BRPM | TEMPERATURE: 97.2 F

## 2025-06-12 DIAGNOSIS — I15.9 SECONDARY HYPERTENSION: Primary | ICD-10-CM

## 2025-06-12 PROCEDURE — 99283 EMERGENCY DEPT VISIT LOW MDM: CPT

## 2025-06-12 PROCEDURE — 99284 EMERGENCY DEPT VISIT MOD MDM: CPT | Performed by: EMERGENCY MEDICINE

## 2025-06-12 RX ORDER — AMLODIPINE BESYLATE 5 MG/1
5 TABLET ORAL DAILY
Qty: 30 TABLET | Refills: 3 | Status: SHIPPED | OUTPATIENT
Start: 2025-06-12 | End: 2025-10-10

## 2025-06-12 NOTE — ED PROVIDER NOTES
"History of Present Illness     History provided by: Patient  Limitations to History: None  External Records Reviewed:     HPI:  Gareth Kaminski is a 59 y.o. male with a history of HTN, schizophrenia that presents to the ED for     Physical Exam   Triage vitals:  T 36.2 °C (97.2 °F)  HR 65  BP (!) 155/91  RR 18  O2 98 %      {ED Physical Exam:12063}    Medical Decision Making & ED Course   Medical Decision Makin y.o. male ***  ----         Social Determinants of Health which Significantly Impact Care: {Social Determinants of Health which Significantly Impact Care:31949::\"None identified\"} {The following actions were taken to address these social determinants:22129}      Chronic conditions affecting the patient's care: See HPI    The patient was discussed with the following consultants/services: Please see ED course for consult transcript        ED Course:     Disposition   {ED Disposition:10484}    Procedures   Procedures    Patient was seen and discussed with the attending of record.    Gregorio Conroy MD  Emergency Medicine  "

## 2025-06-12 NOTE — ED TRIAGE NOTES
Pt to ED for social work consult. Pt is very difficult to understand in triage, pt endorsing abd pain & right side head pain - pt denies any nausea or vomiting. Pt states he would like help getting to Heaven. When asked if feeling suicidal or homicidal pt denies.
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
## 2025-06-12 NOTE — PROGRESS NOTES
"Gareth Kaminski is a 59 y.o. male   Assessment & Plan    SW following for discharge per request of team. Pt reportedly banned from the [facility]. SW called [facility] [phone]. Message left. SW called a second time and confirmed Pt was involved with the police on May 31. They confirmed Pt is not currently banned and can return to shelter.  Met with Pt in ED lobby where he slept overnight. Pt said he felt unsafe at the [facility]. He [facility] SW asked Pt if he felt he needed to talk with the diversion center for his mental health. Pt declined. SW explained shelter [facility] could work with him on housing and he could call [facility] intake.   Pt agreeable to a bus pass and literature on resources. He understands he can return to shelter.  SW went to return to Pt with food and resources on options discussed and Pt had left ED lobby. Confirmed with triage they were unable to find Pt. Confirmed with staff to discharge.       ALISSA Jett    "

## 2025-06-25 ENCOUNTER — HOSPITAL ENCOUNTER (EMERGENCY)
Facility: HOSPITAL | Age: 60
Discharge: HOME | End: 2025-06-25
Payer: COMMERCIAL

## 2025-06-25 VITALS
WEIGHT: 140 LBS | HEIGHT: 73 IN | HEART RATE: 93 BPM | RESPIRATION RATE: 18 BRPM | SYSTOLIC BLOOD PRESSURE: 160 MMHG | OXYGEN SATURATION: 95 % | DIASTOLIC BLOOD PRESSURE: 107 MMHG | TEMPERATURE: 98.4 F | BODY MASS INDEX: 18.55 KG/M2

## 2025-06-25 PROCEDURE — 99281 EMR DPT VST MAYX REQ PHY/QHP: CPT

## 2025-06-25 PROCEDURE — 4500999001 HC ED NO CHARGE

## 2025-06-25 ASSESSMENT — LIFESTYLE VARIABLES
TOTAL SCORE: 0
EVER HAD A DRINK FIRST THING IN THE MORNING TO STEADY YOUR NERVES TO GET RID OF A HANGOVER: NO
EVER FELT BAD OR GUILTY ABOUT YOUR DRINKING: NO
HAVE YOU EVER FELT YOU SHOULD CUT DOWN ON YOUR DRINKING: NO
HAVE PEOPLE ANNOYED YOU BY CRITICIZING YOUR DRINKING: NO

## 2025-06-26 ENCOUNTER — HOSPITAL ENCOUNTER (EMERGENCY)
Facility: HOSPITAL | Age: 60
Discharge: HOME | End: 2025-06-26
Payer: COMMERCIAL

## 2025-06-26 ENCOUNTER — HOSPITAL ENCOUNTER (EMERGENCY)
Facility: HOSPITAL | Age: 60
Discharge: HOME | End: 2025-06-26
Attending: EMERGENCY MEDICINE
Payer: COMMERCIAL

## 2025-06-26 VITALS
OXYGEN SATURATION: 98 % | RESPIRATION RATE: 18 BRPM | TEMPERATURE: 98.4 F | HEART RATE: 78 BPM | SYSTOLIC BLOOD PRESSURE: 160 MMHG | DIASTOLIC BLOOD PRESSURE: 90 MMHG

## 2025-06-26 LAB — GLUCOSE BLD MANUAL STRIP-MCNC: 110 MG/DL (ref 74–99)

## 2025-06-26 PROCEDURE — 99282 EMERGENCY DEPT VISIT SF MDM: CPT

## 2025-06-26 PROCEDURE — 82947 ASSAY GLUCOSE BLOOD QUANT: CPT

## 2025-06-26 PROCEDURE — 99283 EMERGENCY DEPT VISIT LOW MDM: CPT | Performed by: EMERGENCY MEDICINE

## 2025-06-26 PROCEDURE — 4500999001 HC ED NO CHARGE

## 2025-06-26 PROCEDURE — 99281 EMR DPT VST MAYX REQ PHY/QHP: CPT | Performed by: EMERGENCY MEDICINE

## 2025-06-26 ASSESSMENT — PAIN SCALES - GENERAL: PAINLEVEL_OUTOF10: 0 - NO PAIN

## 2025-06-26 ASSESSMENT — LIFESTYLE VARIABLES
EVER HAD A DRINK FIRST THING IN THE MORNING TO STEADY YOUR NERVES TO GET RID OF A HANGOVER: NO
TOTAL SCORE: 0
HAVE PEOPLE ANNOYED YOU BY CRITICIZING YOUR DRINKING: NO
HAVE YOU EVER FELT YOU SHOULD CUT DOWN ON YOUR DRINKING: NO
EVER FELT BAD OR GUILTY ABOUT YOUR DRINKING: NO

## 2025-06-26 ASSESSMENT — PAIN - FUNCTIONAL ASSESSMENT: PAIN_FUNCTIONAL_ASSESSMENT: 0-10

## 2025-06-26 NOTE — ED TRIAGE NOTES
Pt enters ED requesting food. Steady gait. In NAD. Denies injuries or falls. Pt requests BG check with no hx of DM. Baseline hx: housing insecurity, schizophrenia and hypertension

## 2025-06-26 NOTE — ED TRIAGE NOTES
Pt enters ED reporting generalized weakness. Steady gait. In NAD. Denies injuries or falls. Pt requests BG check with no hx of DM. Baseline hx: housing insecurity, schizophrenia and hypertension

## 2025-06-26 NOTE — ED PROVIDER NOTES
Emergency Department Provider Note        History of Present Illness     History provided by: Patient  Limitations to History: None    HPI:  Patient is a 60-year-old male who present emergency department for housing insecurity.  Patient states that he would like his glucose checked as well as food.  Patient states that he would like resources for shelters.  Patient has no history of diabetes  Physical Exam   Triage vitals:  T 36.1 °C (96.9 °F)  HR 82  /77  RR 16  O2 96 % None (Room air)    Physical Exam  Constitutional:       Appearance: Normal appearance.   HENT:      Head: Normocephalic.   Eyes:      Extraocular Movements: Extraocular movements intact.   Cardiovascular:      Rate and Rhythm: Normal rate.   Pulmonary:      Effort: Pulmonary effort is normal.   Abdominal:      General: Abdomen is flat.   Musculoskeletal:         General: Normal range of motion.      Cervical back: Normal range of motion.   Skin:     General: Skin is warm.   Neurological:      Mental Status: He is alert. Mental status is at baseline.   Psychiatric:         Mood and Affect: Mood normal.         Behavior: Behavior normal.          Medical Decision Making & ED Course   Medical Decision Making:    Patient was in emergency department for housing security.  Patient states that he would like his glucose check has no history of diabetes.  Will obtain a glucose check as well as be the patient.  Resources are given to the patient for shelters.  Patient was discharged.  Patient has no other complaints at this time.         EKG Independent Interpretation: EKG not obtained        The patient was discussed with the following consultants/services: None      ED Course as of 06/26/25 0725   Thu Jun 26, 2025   0516 Patient glc 110 [TS]      ED Course User Index  [TS] Whit Ferris MD          Disposition   As a result of the work-up, the patient was discharged home.  he was informed of his diagnosis and instructed to come back with any  concerns or worsening of condition.  he and was agreeable to the plan as discussed above.  he was given the opportunity to ask questions.  All of the patient's questions were answered.    Procedures   Procedures    Patient seen and discussed with ED attending physician.    Whit Ferris MD  Emergency Medicine     Whit Ferris MD  Resident  06/26/25 5956

## 2025-06-28 ENCOUNTER — HOSPITAL ENCOUNTER (EMERGENCY)
Facility: HOSPITAL | Age: 60
Discharge: LEFT BEFORE DISCHARGE | End: 2025-06-28
Payer: COMMERCIAL

## 2025-06-28 VITALS
RESPIRATION RATE: 18 BRPM | HEART RATE: 73 BPM | DIASTOLIC BLOOD PRESSURE: 99 MMHG | SYSTOLIC BLOOD PRESSURE: 149 MMHG | OXYGEN SATURATION: 98 % | TEMPERATURE: 98.2 F

## 2025-06-28 LAB — GLUCOSE BLD MANUAL STRIP-MCNC: 98 MG/DL (ref 74–99)

## 2025-06-28 PROCEDURE — 99281 EMR DPT VST MAYX REQ PHY/QHP: CPT

## 2025-06-28 PROCEDURE — 4500999001 HC ED NO CHARGE

## 2025-06-28 PROCEDURE — 82947 ASSAY GLUCOSE BLOOD QUANT: CPT

## 2025-06-29 ENCOUNTER — HOSPITAL ENCOUNTER (EMERGENCY)
Facility: HOSPITAL | Age: 60
Discharge: LEFT BEFORE DISCHARGE | End: 2025-06-29
Payer: COMMERCIAL

## 2025-06-29 VITALS
TEMPERATURE: 97.7 F | RESPIRATION RATE: 16 BRPM | BODY MASS INDEX: 18.55 KG/M2 | HEIGHT: 73 IN | WEIGHT: 140 LBS | OXYGEN SATURATION: 98 % | DIASTOLIC BLOOD PRESSURE: 113 MMHG | SYSTOLIC BLOOD PRESSURE: 159 MMHG | HEART RATE: 72 BPM

## 2025-06-29 PROCEDURE — 4500999001 HC ED NO CHARGE

## 2025-06-29 ASSESSMENT — PAIN SCALES - GENERAL: PAINLEVEL_OUTOF10: 0 - NO PAIN

## 2025-06-29 ASSESSMENT — PAIN - FUNCTIONAL ASSESSMENT: PAIN_FUNCTIONAL_ASSESSMENT: 0-10

## 2025-06-30 ENCOUNTER — HOSPITAL ENCOUNTER (EMERGENCY)
Facility: HOSPITAL | Age: 60
Discharge: LEFT BEFORE DISCHARGE | End: 2025-06-30
Payer: COMMERCIAL

## 2025-06-30 VITALS
HEART RATE: 88 BPM | TEMPERATURE: 98 F | HEIGHT: 73 IN | WEIGHT: 140 LBS | SYSTOLIC BLOOD PRESSURE: 156 MMHG | OXYGEN SATURATION: 96 % | RESPIRATION RATE: 17 BRPM | BODY MASS INDEX: 18.55 KG/M2 | DIASTOLIC BLOOD PRESSURE: 99 MMHG

## 2025-06-30 PROCEDURE — 4500999001 HC ED NO CHARGE

## 2025-06-30 PROCEDURE — 99281 EMR DPT VST MAYX REQ PHY/QHP: CPT

## 2025-06-30 ASSESSMENT — PAIN SCALES - GENERAL: PAINLEVEL_OUTOF10: 0 - NO PAIN

## 2025-06-30 ASSESSMENT — PAIN - FUNCTIONAL ASSESSMENT: PAIN_FUNCTIONAL_ASSESSMENT: 0-10

## 2025-06-30 NOTE — ED TRIAGE NOTES
Pt presented to ED to speak with a representative bc he is homeless. Came for same complaint yesterday as well.

## 2025-07-01 ENCOUNTER — HOSPITAL ENCOUNTER (EMERGENCY)
Facility: HOSPITAL | Age: 60
Discharge: HOME | End: 2025-07-01
Payer: COMMERCIAL

## 2025-07-01 VITALS
HEART RATE: 62 BPM | DIASTOLIC BLOOD PRESSURE: 82 MMHG | BODY MASS INDEX: 24.52 KG/M2 | WEIGHT: 185 LBS | HEIGHT: 73 IN | RESPIRATION RATE: 16 BRPM | OXYGEN SATURATION: 96 % | TEMPERATURE: 97 F | SYSTOLIC BLOOD PRESSURE: 175 MMHG

## 2025-07-01 PROCEDURE — 99281 EMR DPT VST MAYX REQ PHY/QHP: CPT

## 2025-07-01 PROCEDURE — 4500999001 HC ED NO CHARGE

## 2025-07-01 ASSESSMENT — PAIN - FUNCTIONAL ASSESSMENT: PAIN_FUNCTIONAL_ASSESSMENT: 0-10

## 2025-07-01 ASSESSMENT — PAIN SCALES - GENERAL: PAINLEVEL_OUTOF10: 0 - NO PAIN

## 2025-07-01 NOTE — ED TRIAGE NOTES
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

## 2025-07-05 ENCOUNTER — HOSPITAL ENCOUNTER (EMERGENCY)
Facility: HOSPITAL | Age: 60
Discharge: HOME | End: 2025-07-05
Attending: STUDENT IN AN ORGANIZED HEALTH CARE EDUCATION/TRAINING PROGRAM
Payer: COMMERCIAL

## 2025-07-05 VITALS
TEMPERATURE: 97.6 F | WEIGHT: 185 LBS | SYSTOLIC BLOOD PRESSURE: 145 MMHG | DIASTOLIC BLOOD PRESSURE: 92 MMHG | BODY MASS INDEX: 24.52 KG/M2 | HEART RATE: 70 BPM | RESPIRATION RATE: 18 BRPM | OXYGEN SATURATION: 96 % | HEIGHT: 73 IN

## 2025-07-05 DIAGNOSIS — B07.9 WARTS OF FOOT: Primary | ICD-10-CM

## 2025-07-05 PROCEDURE — 99283 EMERGENCY DEPT VISIT LOW MDM: CPT | Performed by: STUDENT IN AN ORGANIZED HEALTH CARE EDUCATION/TRAINING PROGRAM

## 2025-07-05 PROCEDURE — 99282 EMERGENCY DEPT VISIT SF MDM: CPT | Performed by: STUDENT IN AN ORGANIZED HEALTH CARE EDUCATION/TRAINING PROGRAM

## 2025-07-05 NOTE — ED PROVIDER NOTES
Emergency Department Provider Note       History of Present Illness     History provided by: Patient  Limitations to History: None  External Records Reviewed with Brief Summary: None    HPI:  Gareth Kaminski is a 60 y.o. male presenting for left foot pain.  The patient's pain is intensified over the past 1 week.  The patient has not tried anything for the pain or seeing a different provider.  It is located to the fifth digit on the dorsal side with a small wound to the area    Physical Exam   Triage vitals:  T 36.4 °C (97.6 °F)  HR 70  BP (!) 145/92  RR 18  O2 96 % None (Room air)    Toes on the left foot do have moisture changes with cracking of the skin without significant swelling or erythema.  The patient does have a wart noted to the dorsum of the left pinky toe without surrounding erythema or bleeding.  It is tender to touch.  Intact peripheral pulses including 2+ PT and DP on the left lower extremity      Medical Decision Making & ED Course   Medical Decision Makin y.o. male presenting for foot pain.  History and physical examination was concerning for worsening callus versus wart.  I am less concerned about superimposed soft tissue infection.  I do think that the cracking and drying of the skin after significant moisture does amplify his pain.  Was offered bandage and socks as well as oral pain medication without acceptance.  The patient was referred to podiatry for more long-term management and discharged home.  ----      Differential diagnoses considered include but are not limited to: Please see MDM    Social Determinants of Health which Significantly Impact Care: Social Determinants of Health which Significantly Impact Care: Housing insecurity The following actions were taken to address these social determinants : Patient given referral to podiatry    EKG Independent Interpretation: EKG not obtained    Independent Result Review and Interpretation: None obtained    Chronic conditions affecting  the patient's care: As documented above in [facility]    The patient was discussed with the following consultants/services: None    Care Considerations: As documented above in [facility]    ED Course:  Diagnoses as of 07/05/25 1056   Warts of foot       Disposition   As a result of the work-up, the patient was discharged home.  he was informed of his diagnosis and instructed to come back with any concerns or worsening of condition.  he and was agreeable to the plan as discussed above.  he was given the opportunity to ask questions.  All of the patient's questions were answered.    Procedures   Procedures        Jake Sidhu MD  Emergency Medicine                                                       Jake Sidhu MD  07/05/25 1053

## 2025-07-05 NOTE — ED TRIAGE NOTES
Pt present sot the ED for a bump on his left foot. Pt states this has bene there for a long time and wants to get it looked at. Pt has no other PMHX

## 2025-07-06 ENCOUNTER — HOSPITAL ENCOUNTER (EMERGENCY)
Facility: HOSPITAL | Age: 60
Discharge: HOME | End: 2025-07-06
Payer: COMMERCIAL

## 2025-07-06 VITALS
DIASTOLIC BLOOD PRESSURE: 89 MMHG | RESPIRATION RATE: 15 BRPM | TEMPERATURE: 97.7 F | OXYGEN SATURATION: 95 % | SYSTOLIC BLOOD PRESSURE: 129 MMHG | HEART RATE: 83 BPM

## 2025-07-06 PROCEDURE — 4500999001 HC ED NO CHARGE

## 2025-07-06 PROCEDURE — 99281 EMR DPT VST MAYX REQ PHY/QHP: CPT

## 2025-07-06 NOTE — ED TRIAGE NOTES
"Patient came to ED with c/o fatigue due to a \"medication\" he took. Patient seen multiple times over last couple days, was discharged from [facility] this morning at 0649.   "

## 2025-07-08 ENCOUNTER — HOSPITAL ENCOUNTER (EMERGENCY)
Facility: HOSPITAL | Age: 60
Discharge: HOME | End: 2025-07-08
Payer: COMMERCIAL

## 2025-07-08 VITALS
HEIGHT: 73 IN | RESPIRATION RATE: 16 BRPM | DIASTOLIC BLOOD PRESSURE: 81 MMHG | HEART RATE: 76 BPM | TEMPERATURE: 97.8 F | OXYGEN SATURATION: 97 % | WEIGHT: 185 LBS | BODY MASS INDEX: 24.52 KG/M2 | SYSTOLIC BLOOD PRESSURE: 150 MMHG

## 2025-07-08 VITALS
TEMPERATURE: 97.5 F | RESPIRATION RATE: 15 BRPM | DIASTOLIC BLOOD PRESSURE: 93 MMHG | BODY MASS INDEX: 24.52 KG/M2 | HEART RATE: 87 BPM | WEIGHT: 185 LBS | SYSTOLIC BLOOD PRESSURE: 138 MMHG | OXYGEN SATURATION: 94 % | HEIGHT: 73 IN

## 2025-07-08 DIAGNOSIS — T73.0XXA HUNGRY, INITIAL ENCOUNTER: Primary | ICD-10-CM

## 2025-07-08 LAB — GLUCOSE BLD MANUAL STRIP-MCNC: 99 MG/DL (ref 74–99)

## 2025-07-08 PROCEDURE — 4500999001 HC ED NO CHARGE

## 2025-07-08 PROCEDURE — 99281 EMR DPT VST MAYX REQ PHY/QHP: CPT

## 2025-07-08 PROCEDURE — 82947 ASSAY GLUCOSE BLOOD QUANT: CPT

## 2025-07-08 PROCEDURE — 99283 EMERGENCY DEPT VISIT LOW MDM: CPT

## 2025-07-08 PROCEDURE — 99282 EMERGENCY DEPT VISIT SF MDM: CPT

## 2025-07-08 ASSESSMENT — LIFESTYLE VARIABLES
HAVE YOU EVER FELT YOU SHOULD CUT DOWN ON YOUR DRINKING: NO
EVER HAD A DRINK FIRST THING IN THE MORNING TO STEADY YOUR NERVES TO GET RID OF A HANGOVER: NO
HAVE PEOPLE ANNOYED YOU BY CRITICIZING YOUR DRINKING: NO
EVER FELT BAD OR GUILTY ABOUT YOUR DRINKING: NO
TOTAL SCORE: 0

## 2025-07-08 ASSESSMENT — PAIN - FUNCTIONAL ASSESSMENT: PAIN_FUNCTIONAL_ASSESSMENT: 0-10

## 2025-07-08 ASSESSMENT — PAIN SCALES - GENERAL: PAINLEVEL_OUTOF10: 0 - NO PAIN

## 2025-07-08 NOTE — ED TRIAGE NOTES
Patient came to ED with c/o fatigue, has been seen at multiple emergency rooms over the last couple days. Possibly interested in [facility]?

## 2025-07-09 NOTE — ED TRIAGE NOTES
Pt came to ED wanting to get blood sugar checked, stating he is very sleepy. Pt states he wants two turkey sandwiches and a ginger ale. Pt endorsing no SI/HI or AH/VH. Pts BS in triage = 99

## 2025-07-09 NOTE — ED PROVIDER NOTES
HPI   Chief Complaint   Patient presents with    Blood sugar check       HPI    Gareth Kaminski is a 60-year-old male with history of hypertension and schizophrenia presenting the ED with concern for elevated blood sugar.  Patient states he would like his blood sugar checked today because he thinks it is elevated.  Patient states he has not had elevated blood sugar before.  Patient states he also wants two turkey sandwiches, crackers, and ginger ale because he is hungry.  Patient denies chest pain, shortness of breath, abdominal pain, nausea, vomiting, fevers, body aches, chills.  Patient denies suicidal ideation, homicidal ideation, visual hallucinations, auditory hallucinations.  Patient History   Medical History[1]  Surgical History[2]  Family History[3]  Social History[4]    Physical Exam   ED Triage Vitals [07/08/25 2158]   Temperature Heart Rate Respirations BP   36.6 °C (97.8 °F) 76 16 150/81      Pulse Ox Temp Source Heart Rate Source Patient Position   97 % Temporal -- --      BP Location FiO2 (%)     -- --       Physical Exam  Vitals and nursing note reviewed.   Constitutional:       Appearance: Normal appearance.   Cardiovascular:      Rate and Rhythm: Normal rate and regular rhythm.      Heart sounds: Normal heart sounds. No murmur heard.     No gallop.   Pulmonary:      Effort: Pulmonary effort is normal. No respiratory distress.      Breath sounds: Normal breath sounds. No stridor. No wheezing, rhonchi or rales.   Abdominal:      General: Abdomen is flat. Bowel sounds are normal. There is no distension.      Palpations: Abdomen is soft. There is no mass.      Tenderness: There is no abdominal tenderness. There is no guarding or rebound.      Hernia: No hernia is present.   Musculoskeletal:      Right lower leg: No edema.      Left lower leg: No edema.   Skin:     General: Skin is warm and dry.   Neurological:      General: No focal deficit present.      Mental Status: He is alert and oriented to person,  place, and time.   Psychiatric:         Attention and Perception: He does not perceive auditory or visual hallucinations.         Mood and Affect: Mood and affect normal.         Speech: Speech normal.         Behavior: Behavior normal. Behavior is cooperative.         Thought Content: Thought content is not paranoid or delusional. Thought content does not include homicidal or suicidal ideation. Thought content does not include homicidal or suicidal plan.           ED Course & MDM   Diagnoses as of 07/08/25 2236   Hungry, initial encounter                 No data recorded                                 Medical Decision Making  This is a 60-year-old male presenting the ED with concern for elevated blood sugar.  Blood glucose was obtained in triage it was 99.  Based on point-of-care glucose there is lower suspicion for DKA at this time.  Patient was given crackers, ginger ale, turkey sandwiches per his request.  Patient states he was feeling better after eating and drinking.  Patient denies additional complaints and physical exam is unremarkable therefore additional labs and imaging not obtained today.  Patient has been hemodynamically stable in the emergency department today.    Disposition: Discharge home  Patient advised to follow-up with his primary care provider for further evaluation of his blood sugar.  Strict return precautions were given.  Plan was discussed with the patient patient understands and agrees.  Patient was discharged stable condition.  Patient left before receiving discharge paperwork.    Procedure  Procedures         [1] No past medical history on file.  [2] No past surgical history on file.  [3] No family history on file.  [4]   Social History  Tobacco Use    Smoking status: Every Day     Types: Cigarettes    Smokeless tobacco: Never   Substance Use Topics    Alcohol use: Not on file    Drug use: Rufina Howe PA-C  07/09/25 0138

## 2025-07-10 ENCOUNTER — HOSPITAL ENCOUNTER (EMERGENCY)
Facility: HOSPITAL | Age: 60
Discharge: HOME | End: 2025-07-10
Payer: COMMERCIAL

## 2025-07-10 VITALS
OXYGEN SATURATION: 95 % | RESPIRATION RATE: 16 BRPM | SYSTOLIC BLOOD PRESSURE: 103 MMHG | DIASTOLIC BLOOD PRESSURE: 65 MMHG | TEMPERATURE: 98.9 F | HEART RATE: 92 BPM

## 2025-07-10 DIAGNOSIS — M79.605 PAIN IN BOTH LOWER EXTREMITIES: ICD-10-CM

## 2025-07-10 DIAGNOSIS — F20.9 SCHIZOPHRENIA, UNSPECIFIED TYPE: Primary | ICD-10-CM

## 2025-07-10 DIAGNOSIS — M79.604 PAIN IN BOTH LOWER EXTREMITIES: ICD-10-CM

## 2025-07-10 PROCEDURE — 99284 EMERGENCY DEPT VISIT MOD MDM: CPT | Performed by: PHYSICIAN ASSISTANT

## 2025-07-10 PROCEDURE — 99281 EMR DPT VST MAYX REQ PHY/QHP: CPT

## 2025-07-10 NOTE — ED PROVIDER NOTES
Emergency Department Provider Note        History of Present Illness     60-year-old male with history of HTN, schizophrenia presents reporting leg pain.  States he has been walking around a lot and feels his legs are bothering him.  Denies any fall or any other known injury.  Denies any weakness or paresthesias.  Denies any other complaints including fevers chills night sweats or rigors.  Denies any auditory or visual hallucinations, denies any SI/HI    Medical History[1]  Surgical History[2]  Social History[3]  Allergies[4]      External Records Reviewed including ED notes, H&P, Discharge Summary, outpatient PCP/specialist notes.  Physical Exam       Triage Vitals: T 37.2 °C (98.9 °F)  HR 92  /65  RR 16  O2 95 %    GEN: NAD  EYES:  EOMs grossly intact, anicteric sclera  JUSTINE: Mucosa moist.  NECK: Supple.  CARD: RRR  PULMONARY: Moving air well. Clear all lung fields.  ABDOMEN: Soft, no guarding, no rigidity. Nontender. NABS  EXTREMITIES: Full ROM, no pitting edema,   SKIN: Intact, warm and dry  NEURO: Alert and oriented x 3, speech is clear, no obvious deficits noted.   Psych: Mildly delusional speech however easily redirectable, able to carry coherent conversation      Medical Decision Making & ED Course     60-year-old male with history of HTN, schizophrenia presenting for bilateral leg pain.  On exam he is well-appearing ambulating comfortably.  VSS.  Appears to be at his baseline mild psychosis, no acute psychotic features, denying any HI/SI/AH/VH and able to carry linear conversation with redirection no indication for EPAT at this time.  His leg pain appears to be from overuse/ambulating significantly.  He is offered laboratory work and imaging however he is declining stating he would just like to leave.  He is given a bus pass.  Offered food however he is declining at this time.  Return precautions reviewed.    Diagnoses as of 07/10/25 1159   Schizophrenia, unspecified type   Pain in both lower  extremities     No orders to display     Labs Reviewed - No data to display    ----------------------------------------------------------------------------------------------------------------------------    This note was dictated using a speech recognition program.  While an attempt was made at proof reading to minimize errors, minor errors in transcription may be present call for questions.       [1] No past medical history on file.  [2] No past surgical history on file.  [3]   Social History  Socioeconomic History    Marital status:    Tobacco Use    Smoking status: Every Day     Types: Cigarettes    Smokeless tobacco: Never   Substance and Sexual Activity    Drug use: Defer     Social Drivers of Health     Financial Resource Strain: High Risk (4/4/2025)    Received from [facility]    Overall Financial Resource Strain (CARDIA)     Difficulty of Paying Living Expenses: Very hard   Food Insecurity: Food Insecurity Present (4/4/2025)    Received from [facility]    Hunger Vital Sign     Worried About Running Out of Food in the Last Year: Sometimes true     Ran Out of Food in the Last Year: Sometimes true   Transportation Needs: Unmet Transportation Needs (4/4/2025)    Received from [facility]    PRAPARE - Transportation     Lack of Transportation (Medical): Yes     Lack of Transportation (Non-Medical): Yes   Physical Activity: Sufficiently Active (4/4/2025)    Received from [facility]    Exercise Vital Sign     Days of Exercise per Week: 7 days     Minutes of Exercise per Session: 60 min   Stress: Stress Concern Present (4/4/2025)    Received from [facility]    [employer] - Occupational Stress Questionnaire     Feeling of Stress : Rather much   Social Connections: Not on File (9/13/2024)    Received from [facility]    Social Connections     Connectedness: 0   Housing Stability: High Risk (4/4/2025)    Received from [facility]    Housing Stability  Vital Sign     Unable to Pay for Housing in the Last Year: Yes     Homeless in the Last Year: Yes   [4]   Allergies  Allergen Reactions    Divalproex Unknown    Haloperidol Other    Penicillins Unknown        Kirk Andersen PA-C  07/10/25 1200

## 2025-07-11 ENCOUNTER — HOSPITAL ENCOUNTER (EMERGENCY)
Facility: HOSPITAL | Age: 60
Discharge: HOME | End: 2025-07-11
Payer: COMMERCIAL

## 2025-07-11 ENCOUNTER — HOSPITAL ENCOUNTER (EMERGENCY)
Facility: HOSPITAL | Age: 60
Discharge: LEFT BEFORE DISCHARGE | End: 2025-07-12
Payer: COMMERCIAL

## 2025-07-11 VITALS
DIASTOLIC BLOOD PRESSURE: 95 MMHG | RESPIRATION RATE: 16 BRPM | WEIGHT: 185 LBS | OXYGEN SATURATION: 98 % | HEIGHT: 73 IN | BODY MASS INDEX: 24.52 KG/M2 | HEART RATE: 101 BPM | SYSTOLIC BLOOD PRESSURE: 143 MMHG | TEMPERATURE: 98.1 F

## 2025-07-11 VITALS
HEART RATE: 86 BPM | RESPIRATION RATE: 15 BRPM | SYSTOLIC BLOOD PRESSURE: 146 MMHG | DIASTOLIC BLOOD PRESSURE: 87 MMHG | WEIGHT: 185 LBS | TEMPERATURE: 98 F | HEIGHT: 73 IN | BODY MASS INDEX: 24.52 KG/M2 | OXYGEN SATURATION: 97 %

## 2025-07-11 DIAGNOSIS — L84 CALLUS OF TOE: ICD-10-CM

## 2025-07-11 DIAGNOSIS — L29.9 ITCHING: Primary | ICD-10-CM

## 2025-07-11 PROCEDURE — 4500999001 HC ED NO CHARGE

## 2025-07-11 PROCEDURE — 99283 EMERGENCY DEPT VISIT LOW MDM: CPT | Performed by: NURSE PRACTITIONER

## 2025-07-11 PROCEDURE — 99282 EMERGENCY DEPT VISIT SF MDM: CPT

## 2025-07-11 PROCEDURE — 2500000001 HC RX 250 WO HCPCS SELF ADMINISTERED DRUGS (ALT 637 FOR MEDICARE OP): Mod: SE | Performed by: NURSE PRACTITIONER

## 2025-07-11 RX ORDER — DIPHENHYDRAMINE HCL 25 MG
25 CAPSULE ORAL EVERY 6 HOURS PRN
Qty: 16 CAPSULE | Refills: 0 | Status: SHIPPED | OUTPATIENT
Start: 2025-07-11 | End: 2025-07-15

## 2025-07-11 RX ORDER — DIPHENHYDRAMINE HCL 25 MG
25 CAPSULE ORAL ONCE
Status: COMPLETED | OUTPATIENT
Start: 2025-07-11 | End: 2025-07-11

## 2025-07-11 RX ORDER — DIPHENHYDRAMINE HCL 25 MG
25 CAPSULE ORAL EVERY 4 HOURS PRN
Status: DISCONTINUED | OUTPATIENT
Start: 2025-07-11 | End: 2025-07-11

## 2025-07-11 RX ADMIN — DIPHENHYDRAMINE HCL 25 MG: 25 CAPSULE ORAL at 18:40

## 2025-07-11 ASSESSMENT — LIFESTYLE VARIABLES
TOTAL SCORE: 0
EVER FELT BAD OR GUILTY ABOUT YOUR DRINKING: NO
EVER HAD A DRINK FIRST THING IN THE MORNING TO STEADY YOUR NERVES TO GET RID OF A HANGOVER: NO
TOTAL SCORE: 0
HAVE PEOPLE ANNOYED YOU BY CRITICIZING YOUR DRINKING: NO
EVER HAD A DRINK FIRST THING IN THE MORNING TO STEADY YOUR NERVES TO GET RID OF A HANGOVER: NO
HAVE YOU EVER FELT YOU SHOULD CUT DOWN ON YOUR DRINKING: NO
EVER FELT BAD OR GUILTY ABOUT YOUR DRINKING: NO
HAVE PEOPLE ANNOYED YOU BY CRITICIZING YOUR DRINKING: NO
HAVE YOU EVER FELT YOU SHOULD CUT DOWN ON YOUR DRINKING: NO

## 2025-07-11 ASSESSMENT — PAIN - FUNCTIONAL ASSESSMENT
PAIN_FUNCTIONAL_ASSESSMENT: 0-10

## 2025-07-11 ASSESSMENT — PAIN SCALES - GENERAL
PAINLEVEL_OUTOF10: 0 - NO PAIN

## 2025-07-11 ASSESSMENT — ENCOUNTER SYMPTOMS
CHILLS: 0
DIZZINESS: 0
WOUND: 0
JOINT SWELLING: 0
SHORTNESS OF BREATH: 0
VOMITING: 0
RHINORRHEA: 0
BACK PAIN: 0
NAUSEA: 0
ABDOMINAL PAIN: 0
COUGH: 0
FEVER: 0
HEADACHES: 0

## 2025-07-11 NOTE — ED PROVIDER NOTES
HPI   Chief Complaint   Patient presents with    Itching       HPI  This is a 60 year old male with hx of HTN, schizophrenia who presents to the Emergency Department today with complaints of itching to bilateral arms that has been intermittent x 1 month. Denies any rash or lesions. No associated fever/chills, URI symptoms, CP, SOB, n/v, abd pain, paresthesias, weakness. Denies any recent known exposures to soaps, products, medications, foods, etc. No known poison ivy exposure. Has not tried taking anything at home for symptoms.     Review of Systems   Constitutional:  Negative for chills and fever.   HENT:  Negative for congestion, ear pain and rhinorrhea.    Respiratory:  Negative for cough and shortness of breath.    Cardiovascular:  Negative for chest pain.   Gastrointestinal:  Negative for abdominal pain, nausea and vomiting.   Musculoskeletal:  Negative for back pain, gait problem and joint swelling.   Skin:  Negative for rash and wound.   Neurological:  Negative for dizziness and headaches.           Patient History   Medical History[1]  Surgical History[2]  Family History[3]  Social History[4]    Physical Exam   ED Triage Vitals [07/11/25 1754]   Temperature Heart Rate Respirations BP   36.7 °C (98 °F) 86 15 146/87      Pulse Ox Temp src Heart Rate Source Patient Position   97 % -- -- --      BP Location FiO2 (%)     -- --       Physical Exam  Vitals reviewed.   Constitutional:       Appearance: Normal appearance. He is not ill-appearing.   HENT:      Head: Normocephalic and atraumatic.      Mouth/Throat:      Mouth: Mucous membranes are moist.      Pharynx: No oropharyngeal exudate or posterior oropharyngeal erythema.   Cardiovascular:      Rate and Rhythm: Normal rate and regular rhythm.      Heart sounds: Normal heart sounds.   Pulmonary:      Effort: Pulmonary effort is normal. No respiratory distress.      Breath sounds: Normal breath sounds. No wheezing, rhonchi or rales.   Musculoskeletal:          General: No tenderness. Normal range of motion.      Cervical back: Normal range of motion.      Right lower leg: No edema.      Left lower leg: No edema.      Comments: Callus noted to left 5th toe. No surrounding warmth or erythema.    Skin:     General: Skin is warm and dry.      Findings: No bruising, erythema, lesion or rash.   Neurological:      Mental Status: He is alert and oriented to person, place, and time.      Sensory: No sensory deficit.      Motor: No weakness.      Gait: Gait normal.           ED Course & MDM   Diagnoses as of 07/11/25 1848   Itching   Callus of toe                 No data recorded     Casey Coma Scale Score: 15 (07/11/25 1755 : Petar Holly, RN)                           Medical Decision Making    The patient remains clinically stable while in the ED. 60 year old male with hx of HTN, schizophrenia presents to the ED with complaints of intermittent itching to bilateral arms x 1 month. No known exposures to soaps, products, foods, detergents, medications. No known poison ivy exposure. He is well appearing, afebrile resting comfortably without distress . Patient is well known to our ED with multiple recent visits for various complaints. No evidence of rash or skin lesions on exam. No indication for infection. Patient was given 25mg Benadryl in the ED. Bilateral feet appear dry, callus noted to left 5th toe. He was encouraged to apply moisturizer to bilateral feet and arms daily. Soak feet in warm water 2-3x/day, wear comfortable shoes. Follow up with podiatry (number provided to patient to schedule appointment). Patient is stable for discharge home with rx for Benadryl 25mg PRN. He does feel safe at home, denies AH/VH, SI/HI. Does not appear to be internally stimulated no concern for psychosis. He remains in stable condition at the time of discharge.  Procedure  Procedures       [1] History reviewed. No pertinent past medical history.  [2] History reviewed. No pertinent surgical  history.  [3] No family history on file.  [4]   Social History  Tobacco Use    Smoking status: Every Day     Types: Cigarettes    Smokeless tobacco: Never   Substance Use Topics    Alcohol use: Not on file    Drug use: Rufina Carter, NAVJOT-CNP  07/11/25 7280

## 2025-07-11 NOTE — DISCHARGE INSTRUCTIONS
Moisturizer to bilateral feet. Callus noted to left 5th toe. Warm soaks to bilateral feet. Wear comfortable well fitting shoes. Follow up with podiatry within the next week [phone]

## 2025-07-12 NOTE — ED TRIAGE NOTES
Pt presents to ED reporting that he would like some assistance from [facility]. Pt states that he needs services that will help find him somewhere to go.   
30.65

## 2025-07-13 ENCOUNTER — HOSPITAL ENCOUNTER (EMERGENCY)
Facility: HOSPITAL | Age: 60
Discharge: LEFT BEFORE DISCHARGE | End: 2025-07-13
Payer: COMMERCIAL

## 2025-07-13 VITALS
SYSTOLIC BLOOD PRESSURE: 177 MMHG | WEIGHT: 185 LBS | DIASTOLIC BLOOD PRESSURE: 87 MMHG | HEART RATE: 82 BPM | BODY MASS INDEX: 24.52 KG/M2 | OXYGEN SATURATION: 95 % | TEMPERATURE: 96.8 F | HEIGHT: 73 IN | RESPIRATION RATE: 18 BRPM

## 2025-07-13 LAB — GLUCOSE BLD MANUAL STRIP-MCNC: 100 MG/DL (ref 74–99)

## 2025-07-13 PROCEDURE — 99281 EMR DPT VST MAYX REQ PHY/QHP: CPT

## 2025-07-13 PROCEDURE — 82947 ASSAY GLUCOSE BLOOD QUANT: CPT

## 2025-07-13 ASSESSMENT — LIFESTYLE VARIABLES
HAVE YOU EVER FELT YOU SHOULD CUT DOWN ON YOUR DRINKING: NO
EVER FELT BAD OR GUILTY ABOUT YOUR DRINKING: NO
HAVE PEOPLE ANNOYED YOU BY CRITICIZING YOUR DRINKING: NO
EVER HAD A DRINK FIRST THING IN THE MORNING TO STEADY YOUR NERVES TO GET RID OF A HANGOVER: NO
TOTAL SCORE: 0

## 2025-07-13 ASSESSMENT — PAIN SCALES - GENERAL: PAINLEVEL_OUTOF10: 0 - NO PAIN

## 2025-07-13 ASSESSMENT — PAIN - FUNCTIONAL ASSESSMENT: PAIN_FUNCTIONAL_ASSESSMENT: 0-10

## 2025-07-14 ENCOUNTER — HOSPITAL ENCOUNTER (EMERGENCY)
Facility: HOSPITAL | Age: 60
Discharge: LEFT BEFORE DISCHARGE | End: 2025-07-15
Payer: COMMERCIAL

## 2025-07-14 ENCOUNTER — HOSPITAL ENCOUNTER (EMERGENCY)
Facility: HOSPITAL | Age: 60
Discharge: HOME | End: 2025-07-14
Payer: COMMERCIAL

## 2025-07-14 VITALS
TEMPERATURE: 96.9 F | OXYGEN SATURATION: 97 % | HEART RATE: 83 BPM | DIASTOLIC BLOOD PRESSURE: 96 MMHG | WEIGHT: 185 LBS | RESPIRATION RATE: 17 BRPM | BODY MASS INDEX: 24.52 KG/M2 | HEIGHT: 73 IN | SYSTOLIC BLOOD PRESSURE: 162 MMHG

## 2025-07-14 VITALS
SYSTOLIC BLOOD PRESSURE: 155 MMHG | RESPIRATION RATE: 17 BRPM | TEMPERATURE: 98.7 F | DIASTOLIC BLOOD PRESSURE: 99 MMHG | WEIGHT: 185 LBS | HEIGHT: 73 IN | HEART RATE: 84 BPM | BODY MASS INDEX: 24.52 KG/M2 | OXYGEN SATURATION: 96 %

## 2025-07-14 DIAGNOSIS — Z91.89 AT RISK FOR ABNORMAL BLOOD GLUCOSE LEVEL: Primary | ICD-10-CM

## 2025-07-14 LAB — GLUCOSE BLD MANUAL STRIP-MCNC: 104 MG/DL (ref 74–99)

## 2025-07-14 PROCEDURE — 99282 EMERGENCY DEPT VISIT SF MDM: CPT

## 2025-07-14 PROCEDURE — 99281 EMR DPT VST MAYX REQ PHY/QHP: CPT | Mod: 27

## 2025-07-14 PROCEDURE — 82947 ASSAY GLUCOSE BLOOD QUANT: CPT

## 2025-07-14 PROCEDURE — 99283 EMERGENCY DEPT VISIT LOW MDM: CPT | Performed by: NURSE PRACTITIONER

## 2025-07-14 PROCEDURE — 99281 EMR DPT VST MAYX REQ PHY/QHP: CPT

## 2025-07-14 ASSESSMENT — PAIN - FUNCTIONAL ASSESSMENT: PAIN_FUNCTIONAL_ASSESSMENT: 0-10

## 2025-07-14 ASSESSMENT — PAIN SCALES - GENERAL: PAINLEVEL_OUTOF10: 4

## 2025-07-14 NOTE — ED TRIAGE NOTES
Pt presents to ED reporting that he would like his blood glucose checked, pt.'s glucose was 100 in triage he states that he did not eat today pt was given a turkey sandwich and suger free applesauce

## 2025-07-14 NOTE — ED PROVIDER NOTES
HPI   Chief Complaint   Patient presents with    blood sugar check       HPI  This is a 60 year old male with past medical history significant for HTN and schizophrenia presents to the ED today requesting that we check his blood glucose level and he would also like food.   Per triage his blood glucose level was 104.        Patient History   Medical History[1]  Surgical History[2]  Family History[3]  Social History[4]    Physical Exam   ED Triage Vitals [07/14/25 0818]   Temperature Heart Rate Respirations BP   36.1 °C (96.9 °F) 83 17 (!) 162/96      Pulse Ox Temp src Heart Rate Source Patient Position   97 % -- -- --      BP Location FiO2 (%)     -- --       Physical Exam  Constitutional:       Comments: Disheveled    HENT:      Head: Normocephalic and atraumatic.      Mouth/Throat:      Mouth: Mucous membranes are moist.   Eyes:      Extraocular Movements: Extraocular movements intact.      Pupils: Pupils are equal, round, and reactive to light.   Cardiovascular:      Rate and Rhythm: Normal rate and regular rhythm.   Pulmonary:      Effort: Pulmonary effort is normal.      Breath sounds: Normal breath sounds.   Abdominal:      Palpations: Abdomen is soft.   Musculoskeletal:         General: Normal range of motion.      Cervical back: Normal range of motion and neck supple.   Skin:     General: Skin is warm and dry.   Neurological:      General: No focal deficit present.      Mental Status: He is alert and oriented to person, place, and time.   Psychiatric:         Mood and Affect: Mood normal.         Behavior: Behavior normal.           ED Course & MDM                  No data recorded     Casey Coma Scale Score: 15 (07/14/25 0818 : Katt Figueroa RN)                           Medical Decision Making  This is a 60 year old male with past medical history significant for HTN and schizophrenia presents to the ED today requesting that we check his blood glucose level and he would also like food.   Per triage his  blood glucose level was 104.    I told him what his sugar was and he promptly stood up and left.         Procedure  Procedures       [1] No past medical history on file.  [2] No past surgical history on file.  [3] No family history on file.  [4]   Social History  Tobacco Use    Smoking status: Every Day     Types: Cigarettes    Smokeless tobacco: Never   Substance Use Topics    Alcohol use: Not on file    Drug use: Rufina Barraza, NAVJOT-CNP, [facility]  07/14/25 0837

## 2025-07-16 LAB
ATRIAL RATE: 79 BPM
P AXIS: 64 DEGREES
P OFFSET: 175 MS
P ONSET: 126 MS
PR INTERVAL: 198 MS
Q ONSET: 225 MS
QRS COUNT: 13 BEATS
QRS DURATION: 92 MS
QT INTERVAL: 356 MS
QTC CALCULATION(BAZETT): 408 MS
QTC FREDERICIA: 390 MS
R AXIS: -14 DEGREES
T AXIS: -63 DEGREES
T OFFSET: 403 MS
VENTRICULAR RATE: 79 BPM

## 2025-07-21 ENCOUNTER — HOSPITAL ENCOUNTER (EMERGENCY)
Facility: HOSPITAL | Age: 60
Discharge: HOME | End: 2025-07-21
Payer: COMMERCIAL

## 2025-07-21 VITALS
TEMPERATURE: 97.2 F | OXYGEN SATURATION: 96 % | SYSTOLIC BLOOD PRESSURE: 160 MMHG | HEIGHT: 73 IN | DIASTOLIC BLOOD PRESSURE: 100 MMHG | HEART RATE: 73 BPM | WEIGHT: 187 LBS | BODY MASS INDEX: 24.78 KG/M2 | RESPIRATION RATE: 17 BRPM

## 2025-07-21 DIAGNOSIS — Z91.89 AT RISK FOR ABNORMAL BLOOD GLUCOSE LEVEL: Primary | ICD-10-CM

## 2025-07-21 LAB — GLUCOSE BLD MANUAL STRIP-MCNC: 104 MG/DL (ref 74–99)

## 2025-07-21 PROCEDURE — 99283 EMERGENCY DEPT VISIT LOW MDM: CPT

## 2025-07-21 PROCEDURE — 82947 ASSAY GLUCOSE BLOOD QUANT: CPT

## 2025-07-21 PROCEDURE — 99282 EMERGENCY DEPT VISIT SF MDM: CPT

## 2025-07-21 PROCEDURE — 99281 EMR DPT VST MAYX REQ PHY/QHP: CPT

## 2025-07-21 ASSESSMENT — PAIN SCALES - GENERAL: PAINLEVEL_OUTOF10: 0 - NO PAIN

## 2025-07-21 ASSESSMENT — LIFESTYLE VARIABLES
TOTAL SCORE: 0
HAVE YOU EVER FELT YOU SHOULD CUT DOWN ON YOUR DRINKING: NO
EVER HAD A DRINK FIRST THING IN THE MORNING TO STEADY YOUR NERVES TO GET RID OF A HANGOVER: NO
HAVE PEOPLE ANNOYED YOU BY CRITICIZING YOUR DRINKING: NO
EVER FELT BAD OR GUILTY ABOUT YOUR DRINKING: NO

## 2025-07-21 ASSESSMENT — PAIN - FUNCTIONAL ASSESSMENT: PAIN_FUNCTIONAL_ASSESSMENT: 0-10

## 2025-07-21 NOTE — ED PROVIDER NOTES
HPI   Chief Complaint   Patient presents with    Blood Glucose Check       This patient is a 60-year-old male with PMH of HTN, schizophrenia, homelessness presenting today for a blood sugar check.  Denies any current complaints or symptoms.  No chest pain shortness of breath abdominal pain nausea vomiting lightheadedness or dizziness.  Is requesting provide him breakfast as well.              Patient History   Medical History[1]  Surgical History[2]  Family History[3]  Social History[4]    Physical Exam   ED Triage Vitals [07/21/25 0755]   Temperature Heart Rate Respirations BP   36.2 °C (97.2 °F) 73 17 (!) 160/100      Pulse Ox Temp Source Heart Rate Source Patient Position   96 % Temporal Monitor Sitting      BP Location FiO2 (%)     Left arm --       Physical Exam  Constitutional:       General: He is not in acute distress.     Appearance: Normal appearance. He is not ill-appearing or toxic-appearing.   HENT:      Head: Normocephalic and atraumatic.   Pulmonary:      Effort: Pulmonary effort is normal.     Musculoskeletal:         General: Normal range of motion.     Skin:     General: Skin is warm and dry.     Neurological:      General: No focal deficit present.      Mental Status: He is alert and oriented to person, place, and time.     Psychiatric:         Mood and Affect: Mood normal.         Behavior: Behavior normal.           ED Course & MDM   Diagnoses as of 07/21/25 0815   At risk for abnormal blood glucose level                 No data recorded     Karval Coma Scale Score: 15 (07/21/25 0756 : Marcello Hunt, GERALDINE)                           Medical Decision Making  60-year-old male presenting today for blood glucose check.  History of homelessness and schizophrenia.  Blood sugar was 104.  He denies any other complaints or concern and feels reassured after the blood sugar check.  We provided him with some crackers per his request and discharged home.        Procedure  Procedures       [1] No past  medical history on file.  [2] No past surgical history on file.  [3] No family history on file.  [4]   Social History  Tobacco Use    Smoking status: Every Day     Types: Cigarettes    Smokeless tobacco: Never   Substance Use Topics    Alcohol use: Not on file    Drug use: Rufina Chris PA-C  07/21/25 0816

## 2025-07-21 NOTE — ED TRIAGE NOTES
Patient to ED wanting to have his sugar checked. Patient has empty food containers in hand when walking into triage.  in triage.

## 2025-07-22 ENCOUNTER — HOSPITAL ENCOUNTER (EMERGENCY)
Facility: HOSPITAL | Age: 60
Discharge: HOME | End: 2025-07-22
Attending: EMERGENCY MEDICINE
Payer: COMMERCIAL

## 2025-07-22 ENCOUNTER — HOSPITAL ENCOUNTER (EMERGENCY)
Facility: HOSPITAL | Age: 60
Discharge: HOME | End: 2025-07-22
Payer: COMMERCIAL

## 2025-07-22 VITALS
SYSTOLIC BLOOD PRESSURE: 143 MMHG | RESPIRATION RATE: 16 BRPM | DIASTOLIC BLOOD PRESSURE: 90 MMHG | OXYGEN SATURATION: 98 % | TEMPERATURE: 97.7 F | HEART RATE: 84 BPM

## 2025-07-22 VITALS
RESPIRATION RATE: 18 BRPM | DIASTOLIC BLOOD PRESSURE: 98 MMHG | HEART RATE: 82 BPM | SYSTOLIC BLOOD PRESSURE: 150 MMHG | OXYGEN SATURATION: 96 % | TEMPERATURE: 97 F

## 2025-07-22 DIAGNOSIS — R53.83 FATIGUE, UNSPECIFIED TYPE: Primary | ICD-10-CM

## 2025-07-22 DIAGNOSIS — F20.0 PARANOID SCHIZOPHRENIA (MULTI): ICD-10-CM

## 2025-07-22 DIAGNOSIS — Z59.00 HOMELESSNESS: Primary | ICD-10-CM

## 2025-07-22 LAB — GLUCOSE BLD MANUAL STRIP-MCNC: 103 MG/DL (ref 74–99)

## 2025-07-22 PROCEDURE — 99282 EMERGENCY DEPT VISIT SF MDM: CPT | Mod: 27

## 2025-07-22 PROCEDURE — 99281 EMR DPT VST MAYX REQ PHY/QHP: CPT

## 2025-07-22 PROCEDURE — 99283 EMERGENCY DEPT VISIT LOW MDM: CPT | Performed by: NURSE PRACTITIONER

## 2025-07-22 PROCEDURE — 99282 EMERGENCY DEPT VISIT SF MDM: CPT | Performed by: EMERGENCY MEDICINE

## 2025-07-22 PROCEDURE — 82947 ASSAY GLUCOSE BLOOD QUANT: CPT

## 2025-07-22 PROCEDURE — 99283 EMERGENCY DEPT VISIT LOW MDM: CPT | Performed by: EMERGENCY MEDICINE

## 2025-07-22 SDOH — ECONOMIC STABILITY - HOUSING INSECURITY: HOMELESSNESS UNSPECIFIED: Z59.00

## 2025-07-22 ASSESSMENT — PAIN SCALES - GENERAL: PAINLEVEL_OUTOF10: 0 - NO PAIN

## 2025-07-22 NOTE — ED PROVIDER NOTES
"Emergency Department Provider Note        History of Present Illness     History provided by: Patient  Limitations to History: None  External Records Reviewed with Brief Summary: Reviewed ED visit from yesterday.    HPI:  Gareth Kaminski is a 60 y.o. male with PMHx of HTN and schizophrenia presenting the emergency department with a chief complaint of \"tiredness\" states wanted to get evaluated for mild tiredness but feeling better now.  And I would like to go home.  Patient denies any other complaints at this time.  States he is asymptomatic denies headache, fever, chills, chest pain, shortness of breath, nausea, vomiting, abdominal pain.  Denies HI/SI denies auditory or visual hallucinations at this time.    Physical Exam   Triage vitals:  T 36.1 °C (97 °F)  HR 82  BP (!) 150/98  RR 18  O2 96 % None (Room air)    General: Awake, alert, in no acute distress  Eyes: Gaze conjugate.  No scleral icterus or injection  HENT: Normo-cephalic, atraumatic. No stridor  CV: Regular rate, regular rhythm. Radial pulses 2+ bilaterally  Resp: Breathing non-labored, speaking in full sentences.  Clear to auscultation bilaterally  GI: Soft, non-distended, non-tender. No rebound or guarding.  MSK/Extremities: No gross bony deformities. Moving all extremities  Skin: Warm. Appropriate color  Neuro: Alert. Oriented. Face symmetric. Speech is fluent.  Gross strength and sensation intact in b/l UE and LEs  Psych: Appropriate mood and affect    Medical Decision Making & ED Course   Medical Decision Makin y.o. male with PMHx of HTN and schizophrenia presenting the emergency department for chief complaint of tiredness, noting that he wanted to get evaluated for his tiredness.  On arrival, vital signs stable notable for some hypertension 150/90 otherwise nontachycardic, no acute respiratory distress, nontoxic-appearing.  Afebrile.  Physical exam is unremarkable.  Patient is alert and oriented x 4, at this time does not have any obvious " visual or auditory command hallucinations, patient appears to be at his baseline.  Denies SI/HI. Is calm and cooperative.  I believe patient is not altered or intoxicated, patient has decision making capacity at this time, requesting to leave and be discharged.  I believe patient is safe and stable for discharge at this time.    ----  Social Determinants of Health which Significantly Impact Care: None identified     EKG Independent Interpretation: EKG not obtained    Independent Result Review and Interpretation: None obtained    Chronic conditions affecting the patient's care: As documented above in MDM    The patient was discussed with the following consultants/services: None    Care Considerations: None    ED Course:  Diagnoses as of 07/22/25 0149   Fatigue, unspecified type     Disposition   As a result of the work-up, the patient was discharged home.  he was informed of his diagnosis and instructed to come back with any concerns or worsening of condition.  he and was agreeable to the plan as discussed above.  he was given the opportunity to ask questions.  All of the patient's questions were answered.    Procedures   Procedures    Patient seen and discussed with ED attending physician.    Clemente Garcia, DO  Emergency Medicine     Clemente Garcia, DO  Resident  07/22/25 0149

## 2025-07-22 NOTE — ED TRIAGE NOTES
Pt presents to the ED for a psych evaluation. 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

## 2025-07-22 NOTE — ED PROVIDER NOTES
Emergency Department Encounter  [facility] EMERGENCY MEDICINE    Patient: Gareth Kaminski  MRN: [id number]  : 1965  Date of Evaluation: 2025  ED Provider: KENRICK Maxwell      Chief Complaint       Chief Complaint   Patient presents with    BG check        Limitations to History: none  Historian: patient  Records reviewed: EMR inpatient and outpatient notes, Care Everywhere    This is a 60-year-old male with a PMH of schizophrenia, hypertension and homelessness who presented to [facility] ED for a blood glucose check.  Patient has presented multiple times to the emergency room with similar symptoms.  Patient does not have a history of diabetes.  Patient denies any signs of symptoms with hyperglycemia or hypoglycemia.    PMH: Schizophrenia, HTN, homelessness  PSH: Denies  Allergies: Reviewed per EMR  Social HX: Former smoker, occasional alcohol use, denies any drug use.  Family HX: No family history pertinent to current presenting problem  Medications: Reviewed per EMR    ROS:     Review of Systems  14 systems reviewed and otherwise acutely negative except as in the [place].        Past History   Medical History[1]  Surgical History[2]      Medications/Allergies     Previous Medications    ACETAMINOPHEN (TYLENOL) 325 MG TABLET    Take 2 tablets (650 mg) by mouth every 6 hours if needed for mild pain (1 - 3) or fever (temp greater than 38.0 C).    AMLODIPINE (NORVASC) 5 MG TABLET    Take 1 tablet (5 mg) by mouth once daily.    DIPHENHYDRAMINE (BENADRYL) 25 MG CAPSULE    Take 1 capsule (25 mg) by mouth every 6 hours if needed for itching for up to 4 days.    IBUPROFEN 600 MG TABLET    Take 1 tablet (600 mg) by mouth every 6 hours if needed for mild pain (1 - 3) or fever (temp greater than 38.0 C).    LIDOCAINE 4 % KIT    Apply 1 patch topically once every 24 hours. Apply to affected area for 12hrs, remove for 12 hours, repeat with new patch the next day     Allergies[3]     Physical Exam        ED Triage Vitals [07/22/25 1721]   Temperature Heart Rate Respirations BP   36.5 °C (97.7 °F) 84 16 143/90      Pulse Ox Temp Source Heart Rate Source Patient Position   98 % Temporal -- --      BP Location FiO2 (%)     -- --       Physical Exam:    Appearance: Alert, oriented , cooperative,  in no acute distress.     Skin: Intact,  dry skin, no lesions, rash, petechiae or purpura.     Eyes: PERRLA, EOMs intact.    ENT: Hearing grossly intact. External auditory canals patent, tympanic membranes intact with visible landmarks. Nares patent, mucus membranes moist. Dentition without lesions. Pharynx clear, uvula midline.     Neck: Supple, without meningismus.     Pulmonary: Clear bilaterally with good chest wall excursion. No rales, rhonchi or wheezing. No accessory muscle use or stridor.    Cardiac: Normal S1, S2 without murmur, rub, gallop or extrasystole. No JVD, Carotids without bruits.    Abdomen: Soft, nontender, active bowel sounds.  No palpable organomegaly.  No rebound or guarding.     Musculoskeletal: Full range of motion. no pain, edema, or deformity. Pulses full and equal. No cyanosis, clubbing, or edema.    Neurological:  Normal sensation, no weakness, no focal findings identified.    Psychiatric: Appropriate mood and affect.       Diagnostics   Labs:  Results for orders placed or performed during the hospital encounter of 07/22/25 (from the past 24 hours)   POCT GLUCOSE   Result Value Ref Range    POCT Glucose 103 (H) 74 - 99 mg/dL      Radiographs:  No orders to display         Assessment   In brief, Gareth Kaminski is a 60 y.o. male who presented to the emergency department to have her blood sugar level checked.          ED Course/MDM     Diagnoses as of 07/22/25 1739   Homelessness   Paranoid schizophrenia (Multi)      Visit Vitals  /90   Pulse 84   Temp 36.5 °C (97.7 °F) (Temporal)   Resp 16   SpO2 98%   Smoking Status Every Day       Medications - No data to display    Patient remained stable  while in the emergency department. Previous outpatient and ED records were reviewed. Outside records were reviewed.  Blood sugar level was checked which was 103 in the emergency room.  Patient has presented multiple times to various emergency room's with similar symptoms.  Patient does not have any signs or symptoms of hyperglycemia or hypoglycemia.  Denies any suicidal or homicidal ideations.  Patient has been calm and cooperative while in the emergency room. Patient was advised to follow with his PCP and return to the emergency department with worsening symptoms.    Final Impression      1. Homelessness    2. Paranoid schizophrenia (Multi)          DISPOSITION  Disposition: Discharged home    Comment: Please note this report has been produced using speech recognition software and may contain errors related to that system including errors in grammar, punctuation, and spelling, as well as words and phrases that may be inappropriate.  If there are any questions or concerns please feel free to contact the dictating provider for clarification.    NAVJOT Maxwell-CINDA             [1] History reviewed. No pertinent past medical history.  [2] History reviewed. No pertinent surgical history.  [3]   Allergies  Allergen Reactions    Divalproex Unknown    Haloperidol Other    Penicillins Unknown        NAVJOT Maxwell-CNP  07/22/25 7107

## 2025-07-23 ENCOUNTER — HOSPITAL ENCOUNTER (EMERGENCY)
Facility: HOSPITAL | Age: 60
Discharge: HOME | End: 2025-07-23
Attending: STUDENT IN AN ORGANIZED HEALTH CARE EDUCATION/TRAINING PROGRAM
Payer: COMMERCIAL

## 2025-07-23 VITALS
TEMPERATURE: 97.9 F | HEART RATE: 79 BPM | OXYGEN SATURATION: 98 % | BODY MASS INDEX: 24.78 KG/M2 | WEIGHT: 187 LBS | DIASTOLIC BLOOD PRESSURE: 96 MMHG | RESPIRATION RATE: 18 BRPM | HEIGHT: 73 IN | SYSTOLIC BLOOD PRESSURE: 174 MMHG

## 2025-07-23 DIAGNOSIS — R10.9 ABDOMINAL PAIN, UNSPECIFIED ABDOMINAL LOCATION: Primary | ICD-10-CM

## 2025-07-23 LAB — GLUCOSE BLD MANUAL STRIP-MCNC: 106 MG/DL (ref 74–99)

## 2025-07-23 PROCEDURE — 99284 EMERGENCY DEPT VISIT MOD MDM: CPT | Performed by: PHYSICIAN ASSISTANT

## 2025-07-23 PROCEDURE — 82947 ASSAY GLUCOSE BLOOD QUANT: CPT

## 2025-07-23 PROCEDURE — 99282 EMERGENCY DEPT VISIT SF MDM: CPT

## 2025-07-23 PROCEDURE — 99281 EMR DPT VST MAYX REQ PHY/QHP: CPT | Performed by: STUDENT IN AN ORGANIZED HEALTH CARE EDUCATION/TRAINING PROGRAM

## 2025-07-24 ENCOUNTER — HOSPITAL ENCOUNTER (EMERGENCY)
Facility: HOSPITAL | Age: 60
Discharge: HOME | End: 2025-07-24
Payer: COMMERCIAL

## 2025-07-24 VITALS
DIASTOLIC BLOOD PRESSURE: 84 MMHG | HEART RATE: 75 BPM | TEMPERATURE: 97.7 F | SYSTOLIC BLOOD PRESSURE: 144 MMHG | OXYGEN SATURATION: 97 % | RESPIRATION RATE: 18 BRPM

## 2025-07-24 DIAGNOSIS — Z59.00 UNHOUSED PERSON: Primary | ICD-10-CM

## 2025-07-24 PROCEDURE — 99281 EMR DPT VST MAYX REQ PHY/QHP: CPT

## 2025-07-24 PROCEDURE — 99284 EMERGENCY DEPT VISIT MOD MDM: CPT | Performed by: PHYSICIAN ASSISTANT

## 2025-07-24 SDOH — ECONOMIC STABILITY - HOUSING INSECURITY: HOMELESSNESS UNSPECIFIED: Z59.00

## 2025-07-24 NOTE — DISCHARGE INSTRUCTIONS
You should follow-up with your primary care provider within 1 week.    You can return the emergency department if you begin experiencing new or worsening symptoms, fever/chills, uncontrolled abdominal pain pain, uncontrolled nausea or vomiting, inability to tolerate oral intake.

## 2025-07-24 NOTE — ED PROVIDER NOTES
Emergency Department Provider Note        History of Present Illness     History provided by: Patient  Limitations to History: None  External Records Reviewed with Brief Summary: previous notes    HPI:  Gareth Kaminski is a 60 y.o. male who presents to the ER frequently including 3 times on July 22 as well as yesterday who presents today for evaluation of wanting to talk to social work.  Patient states that he does not like where he is currently staying and he would like to talk to him about another place to stay.  I asked patient why he is currently staying and if he is staying at a shelter and he states yes but will not tell me which 1.  He denies any medical complaints today, denies suicidal or homicidal ideation, denies hallucinations or delusions, he pulls out his old testament that he has in his pocket and tells me that he is been doing better ever since he found God.  He tells me someday he would like to go to [facility] but states that today is not that day, states he is not willing to wait for it.    Physical Exam   Triage vitals:  T 36.5 °C (97.7 °F)  HR 75  /84  RR 18  O2 97 % None (Room air)    Physical Exam  Vitals and nursing note reviewed.   Constitutional:       General: He is not in acute distress.     Appearance: Normal appearance. He is not toxic-appearing.      Comments: Poor hygiene noted.   HENT:      Head: Normocephalic and atraumatic.      Nose: Nose normal.     Eyes:      Extraocular Movements: Extraocular movements intact.       Cardiovascular:      Rate and Rhythm: Normal rate and regular rhythm.   Pulmonary:      Effort: Pulmonary effort is normal.   Abdominal:      Palpations: Abdomen is soft.     Musculoskeletal:         General: Normal range of motion.      Cervical back: Normal range of motion and neck supple.     Skin:     General: Skin is warm and dry.     Neurological:      General: No focal deficit present.      Mental Status: He is alert.     Psychiatric:         Mood and  Affect: Mood normal.         Thought Content: Thought content normal.          Medical Decision Making & ED Course   Medical Decision Makin y.o. male presents today requesting to speak with social work, social work was consulted.  I have no concerns for decompensated psychiatric process, he has no suicidal or homicidal ideation, no hallucinations, is at his baseline.  Social work consulted to assist with housing insecurity, patient will be discharged once social work is able to provide resources.  ----      Differential diagnoses considered include but are not limited to: n/a     Social Determinants of Health which Significantly Impact Care: Housing insecurity The following actions were taken to address these social determinants: Patient offered evaluation by Social Work    EKG Independent Interpretation: EKG not obtained    Independent Result Review and Interpretation: None obtained    Chronic conditions affecting the patient's care: As documented above in [facility]    The patient was discussed with the following consultants/services: social work    Care Considerations: As documented above in [facility]    ED Course:     Disposition   As a result of the work-up, the patient was discharged home.  he was informed of his diagnosis and instructed to come back with any concerns or worsening of condition.  he and was agreeable to the plan as discussed above.  he was given the opportunity to ask questions.  All of the patient's questions were answered.    Procedures   Procedures    Patient was seen independently    Molly Ramos PA-C  Emergency Medicine       Molly Ramos PA-C  25 0089       Molly Ramos PA-C  25 1738

## 2025-07-24 NOTE — ED PROVIDER NOTES
History of Present Illness       Limitations to history: None  Independent Historian: patient  External Records Reviewed: EMR, outside records, Care-everywhere    HPI:  Patient is a 60-year-old male with PMH schizophrenia, HTN, housing insecurity, HLD, frequently known to this emergency department presents with abdominal pain that started today. In triage, patient endorsing chief complaint of abdominal pain, however, patient denies any abdominal pain at this time. Patient denies fever, chest pain, shortness of  breath, nausea, vomiting, diarrhea, or constipation. Patient states he felt like coming in because he does not like his shelter and he feels like spirits are there. Patient denies auditory or visual hallucinations. Patient denies SI & HI. Patient also requesting blood glucose check. Patient requesting ginger ale and crackers. Patient denies illicit drug or alcohol use. No other complaints at this time.      History provided by:  Patient  History limited by:  Psychiatric disorder    Physical Exam   Physical Exam  Vitals and nursing note reviewed.   Constitutional:       Comments: Patient alert and oriented, nontoxic or ill appearing, in no acute distress sitting in the chair without complaints. Afebrile & VSS.   HENT:      Head: Normocephalic and atraumatic.      Mouth/Throat:      Mouth: Mucous membranes are moist.     Eyes:      Extraocular Movements: Extraocular movements intact.      Pupils: Pupils are equal, round, and reactive to light.       Cardiovascular:      Rate and Rhythm: Normal rate and regular rhythm.      Heart sounds: Normal heart sounds.   Pulmonary:      Effort: Pulmonary effort is normal.      Breath sounds: Normal breath sounds.   Abdominal:      General: Abdomen is flat. Bowel sounds are normal. There is no distension. There are no signs of injury.      Palpations: Abdomen is soft. There is no mass.      Tenderness: There is no abdominal tenderness. There is no guarding or rebound.  "    Skin:     General: Skin is warm and dry.      Capillary Refill: Capillary refill takes less than 2 seconds.      Coloration: Skin is not jaundiced or pale.      Findings: No erythema or rash.     Neurological:      General: No focal deficit present.      Mental Status: He is oriented to person, place, and time.      Cranial Nerves: No cranial nerve deficit.      Motor: No weakness.     Psychiatric:         Attention and Perception: Attention and perception normal. He does not perceive auditory or visual hallucinations.         Mood and Affect: Mood and affect normal.         Speech: Speech normal.         Behavior: Behavior normal. Behavior is cooperative.        Triage vitals:  T 36.6 °C (97.9 °F)  HR 79  BP (!) 174/96  RR 18  O2 98 % None (Room air)    Medical Decision Making & ED Course     ED Course & [facility]     Medical Decision Making  Patient is a 60-year-old male with PMH schizophrenia, HTN, housing insecurity, HLD, frequently known to this emergency department presents with no medical complaints today. Patient alert and oriented, nontoxic or ill appearing, in no acute distress sitting in the chair without complaints. Afebrile & VSS. Triage note indicated abdominal pain, however, in our conversation patient denies any pain. Physical exam grossly unremarkable. Patient requesting blood glucose check. POC glu 106. Patient given food and ginger ale. Patient tolerating oral intake. Patient feels safe to leave and return to shelter. Patient has no other complaints. Patient was stable at the time of discharge.    Patient was discussed and examined by Dr. Nancy Lovett MD.      Amount and/or Complexity of Data Reviewed  External Data Reviewed: labs, radiology and notes.  Labs: ordered. Decision-making details documented in ED Course.      ----    Visit Vitals  BP (!) 174/96   Pulse 79   Temp 36.6 °C (97.9 °F) (Temporal)   Resp 18   Ht 1.854 m (6' 1\")   Wt 84.8 kg (187 lb)   SpO2 98%   BMI 24.67 kg/m²   Smoking " Status Every Day   BSA 2.09 m²        Labs Reviewed   POCT GLUCOSE - Abnormal       Result Value    POCT Glucose 106 (*)    POCT GLUCOSE METER       No orders to display       ED Course:  Diagnoses as of 07/26/25 0228   Abdominal pain, unspecified abdominal location     Disposition     As result of the workup, the patient was discharged home.  Patient was informed of their diagnosis and instructed to come back with any concerns or worsening of condition, agreeable to the plan above.  Patient given the opportunity ask questions, all of the patient's questions were answered. Patient was stable at the time of discharge.      Procedures   Procedures    COMMENT: Please note this report has been produced using speech recognition software and may contain errors related that system including errors in grammar, punctuation, spelling as well as words and phrases that may be inappropriate.  If there are any questions or concerns please feel free to contact the dictating provider for clarification.     Miguelina Salas PA-C  Emergency Medicine      Miguelina Salas PA-C  07/26/25 0233

## 2025-07-24 NOTE — DISCHARGE INSTRUCTIONS
Please return to the ER or seek immediate medical attention if you experience medical symptoms, psychiatric symptoms    You are welcome back any time. Thank you for entrusting your care to us, I hope we made your visit as pleasant as possible. Wishing you well!    Molly Ramos PA-C

## 2025-07-25 ENCOUNTER — HOSPITAL ENCOUNTER (EMERGENCY)
Facility: HOSPITAL | Age: 60
Discharge: HOME | End: 2025-07-25
Payer: COMMERCIAL

## 2025-07-25 VITALS
SYSTOLIC BLOOD PRESSURE: 139 MMHG | WEIGHT: 187 LBS | OXYGEN SATURATION: 98 % | DIASTOLIC BLOOD PRESSURE: 82 MMHG | HEIGHT: 73 IN | BODY MASS INDEX: 24.78 KG/M2 | HEART RATE: 84 BPM | RESPIRATION RATE: 16 BRPM | TEMPERATURE: 97.5 F

## 2025-07-25 DIAGNOSIS — Z59.00 HOMELESS: Primary | ICD-10-CM

## 2025-07-25 PROCEDURE — 99283 EMERGENCY DEPT VISIT LOW MDM: CPT

## 2025-07-25 SDOH — ECONOMIC STABILITY - HOUSING INSECURITY: HOMELESSNESS UNSPECIFIED: Z59.00

## 2025-07-25 ASSESSMENT — PAIN - FUNCTIONAL ASSESSMENT: PAIN_FUNCTIONAL_ASSESSMENT: 0-10

## 2025-07-25 ASSESSMENT — PAIN SCALES - GENERAL: PAINLEVEL_OUTOF10: 0 - NO PAIN

## 2025-07-25 NOTE — ED TRIAGE NOTES
"Patient comes in requesting to speak with sw related to housing insecurity; denies any pain; only states \"I'm tired\" ; PMHx of HTN and schizophrenia, denies SI/HI/AVH in triage   "

## 2025-07-25 NOTE — ED PROVIDER NOTES
HPI   Chief Complaint   Patient presents with    Social Work       60-year-old male PMH schizophrenia, HTN, housing insecurity, HLD frequently known to this emergency department presents emergency department today for chief complaints of requesting to speak to social work.  Patient states he would like to talk to  about places to stay at this time.  Patient would not elaborate any further on this.  Denies any medical complaints today.  Denies any alcohol or drug use, suicidal or homicidal ideation, AH, VH.  No other complaints.              Patient History   Medical History[1]  Surgical History[2]  Family History[3]  Social History[4]    Physical Exam   ED Triage Vitals [07/25/25 1639]   Temperature Heart Rate Respirations BP   36.4 °C (97.5 °F) 84 16 139/82      Pulse Ox Temp src Heart Rate Source Patient Position   98 % -- -- --      BP Location FiO2 (%)     -- --       Physical Exam  Vitals and nursing note reviewed.   Constitutional:       General: He is not in acute distress.     Appearance: Normal appearance. He is normal weight. He is not ill-appearing, toxic-appearing or diaphoretic.   HENT:      Head: Normocephalic and atraumatic.      Mouth/Throat:      Mouth: Mucous membranes are moist.      Pharynx: Oropharynx is clear. No oropharyngeal exudate or posterior oropharyngeal erythema.     Eyes:      General:         Right eye: No discharge.         Left eye: No discharge.      Extraocular Movements: Extraocular movements intact.      Conjunctiva/sclera: Conjunctivae normal.      Pupils: Pupils are equal, round, and reactive to light.       Cardiovascular:      Rate and Rhythm: Normal rate and regular rhythm.      Heart sounds: Normal heart sounds. No murmur heard.     No friction rub. No gallop.   Pulmonary:      Effort: Pulmonary effort is normal. No respiratory distress.      Breath sounds: Normal breath sounds. No stridor. No wheezing, rhonchi or rales.     Neurological:      Mental Status:  He is alert.           ED Course & MDM                  No data recorded     Casey Coma Scale Score: 15 (07/25/25 1640 : Yissel Goyal RN)                           Medical Decision Making  60-year-old male frequently known to this emergency department presents emergency department today for chief complaint of requesting to speak to social work related to housing insecurity.  Patient on exam today is well-appearing.  Stable vital signs in the emergency department.  Denies any SI, HI, AH, VH.  Denies any somatic complaints.  Does not appear internally stimulated.  Alert and oriented at bedside.  Will reach out to social work to see if there is any additional resources they can offer this patient compared to his visit from 24 hours ago.    Patient was seen as per social work and was provided information on shelters in the area.  From medical standpoint patient does not require any medical interventions or care at this time.  Based on examination today no further indication for additional treatment.    Remains well-appearing, ambulatory, afebrile and does not appear in acute distress here in the emergency department overall low concern for psychiatric process or acute decompensation..  Patient exhibits no suicidal or homicidal ideation, does not appear internally stimulated. Patient did have opportunity to ask questions and have them answered and had no further complaints at this time and was amenable to plan moving forward for discharge.        Procedure  Procedures         [1] No past medical history on file.  [2] No past surgical history on file.  [3] No family history on file.  [4]   Social History  Tobacco Use    Smoking status: Every Day     Types: Cigarettes    Smokeless tobacco: Never   Substance Use Topics    Alcohol use: Not on file    Drug use: Rufina Lee PA-C  07/25/25 2211

## 2025-07-25 NOTE — PROGRESS NOTES
"Gareth Kaminski is a 60 y.o. male on day 0 of admission        07/25/25 0701   Discharge Planning   Type of Residence Homeless   Expected Discharge Disposition Home  (Homeless Shelter)   Does the patient need discharge transport arranged? No   Intensity of Service   Intensity of Service 0-30 min     TCC notified by Provider patient requesting to see SW regarding homeless and need for somewhere to stay. Pt sitting in waiting room. TCC spoke with patient to confirm request. Pt spoke in mumbles, and TCC unable to understand much of what pt said despite TCC requesting pt to speak louder. Upon the question of if he was needing a place to go for shelter pt reported \"I am supposed to go to [facility]\".     TCC did provide Street [facility] information. Showed him the number for coordinated intake and instructed on how to call out on waiting room phone.  Pt asked for food. TCC provided patient with a sandwich, britatny crackers and a ginger ale.      Lian Castro RN  ED Transitional Care Coordinator   Secure Chat  [phone]  "

## 2025-07-27 ENCOUNTER — HOSPITAL ENCOUNTER (EMERGENCY)
Facility: HOSPITAL | Age: 60
Discharge: HOME | End: 2025-07-28
Attending: EMERGENCY MEDICINE
Payer: COMMERCIAL

## 2025-07-27 ENCOUNTER — HOSPITAL ENCOUNTER (EMERGENCY)
Facility: HOSPITAL | Age: 60
Discharge: LEFT BEFORE DISCHARGE | End: 2025-07-27
Payer: COMMERCIAL

## 2025-07-27 VITALS
HEIGHT: 73 IN | HEART RATE: 108 BPM | OXYGEN SATURATION: 98 % | DIASTOLIC BLOOD PRESSURE: 93 MMHG | TEMPERATURE: 97.5 F | WEIGHT: 187 LBS | SYSTOLIC BLOOD PRESSURE: 170 MMHG | RESPIRATION RATE: 20 BRPM | BODY MASS INDEX: 24.78 KG/M2

## 2025-07-27 VITALS
OXYGEN SATURATION: 97 % | HEIGHT: 73 IN | DIASTOLIC BLOOD PRESSURE: 111 MMHG | RESPIRATION RATE: 16 BRPM | WEIGHT: 187 LBS | HEART RATE: 75 BPM | SYSTOLIC BLOOD PRESSURE: 180 MMHG | BODY MASS INDEX: 24.78 KG/M2 | TEMPERATURE: 97.9 F

## 2025-07-27 DIAGNOSIS — Z76.89 ENCOUNTER FOR PSYCHIATRIC ASSESSMENT: Primary | ICD-10-CM

## 2025-07-27 LAB — GLUCOSE BLD MANUAL STRIP-MCNC: 102 MG/DL (ref 74–99)

## 2025-07-27 PROCEDURE — 99281 EMR DPT VST MAYX REQ PHY/QHP: CPT

## 2025-07-27 PROCEDURE — 4500999001 HC ED NO CHARGE

## 2025-07-27 PROCEDURE — 99281 EMR DPT VST MAYX REQ PHY/QHP: CPT | Performed by: EMERGENCY MEDICINE

## 2025-07-27 PROCEDURE — 82947 ASSAY GLUCOSE BLOOD QUANT: CPT

## 2025-07-27 ASSESSMENT — PAIN - FUNCTIONAL ASSESSMENT
PAIN_FUNCTIONAL_ASSESSMENT: 0-10

## 2025-07-27 ASSESSMENT — PAIN SCALES - GENERAL
PAINLEVEL_OUTOF10: 0 - NO PAIN
PAINLEVEL_OUTOF10: 0 - NO PAIN

## 2025-07-27 NOTE — ED TRIAGE NOTES
Pt presents to ED for a sugar check. Pt states he needs his sugar checked, states he has not had his sugar checked in a week. Pt  in triage. Pt denies other medical complaints. Pt also states he had a problem with another resident at [address] [place] [address], states that's why he came here. Pt denies feeling unsafe going back there.

## 2025-07-28 ENCOUNTER — HOSPITAL ENCOUNTER (EMERGENCY)
Facility: HOSPITAL | Age: 60
Discharge: LEFT BEFORE DISCHARGE | End: 2025-07-28
Payer: COMMERCIAL

## 2025-07-28 VITALS
OXYGEN SATURATION: 97 % | HEIGHT: 73 IN | SYSTOLIC BLOOD PRESSURE: 159 MMHG | HEART RATE: 81 BPM | TEMPERATURE: 97.2 F | BODY MASS INDEX: 24.78 KG/M2 | RESPIRATION RATE: 16 BRPM | WEIGHT: 187 LBS | DIASTOLIC BLOOD PRESSURE: 94 MMHG

## 2025-07-28 DIAGNOSIS — R53.83 OTHER FATIGUE: Primary | ICD-10-CM

## 2025-07-28 LAB — GLUCOSE BLD MANUAL STRIP-MCNC: 84 MG/DL (ref 74–99)

## 2025-07-28 PROCEDURE — 82947 ASSAY GLUCOSE BLOOD QUANT: CPT

## 2025-07-28 PROCEDURE — 99283 EMERGENCY DEPT VISIT LOW MDM: CPT

## 2025-07-28 PROCEDURE — 99281 EMR DPT VST MAYX REQ PHY/QHP: CPT | Performed by: EMERGENCY MEDICINE

## 2025-07-28 PROCEDURE — 99282 EMERGENCY DEPT VISIT SF MDM: CPT

## 2025-07-28 PROCEDURE — 4500999001 HC ED NO CHARGE

## 2025-07-28 PROCEDURE — 99283 EMERGENCY DEPT VISIT LOW MDM: CPT | Performed by: EMERGENCY MEDICINE

## 2025-07-28 ASSESSMENT — PAIN - FUNCTIONAL ASSESSMENT: PAIN_FUNCTIONAL_ASSESSMENT: 0-10

## 2025-07-28 ASSESSMENT — PAIN SCALES - GENERAL: PAINLEVEL_OUTOF10: 0 - NO PAIN

## 2025-07-28 NOTE — ED PROVIDER NOTES
History of Present Illness     History provided by: Patient  Limitations to History: None  External Records Reviewed with Brief Summary: none    HPI:  Gareth Kaminski is a 60 y.o. male with a past medical history of schizophrenia present emergency department for fatigue. Patient states that he been feeling fatigued and wants to check the glucose.  Patient is requesting food and a breakfast tray.  Patient states that he has been having some intermittent abdominal pain however unable to say exactly when this started.  Patient denies any nausea, vomiting, chest pain, shortness of breath.  Patient denies any SI, HI, auditory visual hallucinations.    Physical Exam   Triage vitals:  T 36.2 °C (97.2 °F)  HR 81  BP (!) 159/94  RR 16  O2 97 %      General: Awake, alert, in no acute distress  Eyes: Gaze conjugate.  No scleral icterus or injection  HENT: Normo-cephalic, atraumatic. No stridor  CV: Regular rate, regular rhythm. Radial pulses 2+ bilaterally  Resp: Breathing non-labored, speaking in full sentences.  Clear to auscultation bilaterally  GI: Soft, non-distended, non-tender. No rebound or guarding.  : Deferred  MSK/Extremities: No gross bony deformities. Moving all extremities  Skin: Warm. Appropriate color  Neuro: Alert. Oriented. Face symmetric. Speech is fluent.  Gross strength and sensation intact in b/l UE and LEs  Psych: Appropriate mood and affect    Medical Decision Making & ED Course   Medical Decision Makin y.o. male with a past medical history of schizophrenia present emergency department for fatigue. Patient presents vitally stable, no acute distress, nontoxic-appearing.  Patient appears well, ambulating well, no signs of acute hemorrhage.  Blood glucose of 84.  Patient was given sandwich and was p.o. challenge.  I have low concerns for any acute pathologies at this time.  This also may be some malingering on his part given the patient has been seen in the emergency room multiple times for  similar concerns of glucose chest and fatigue.  Patient was discharged.    ----  Scoring Tools Utilized: none     Social Determinants of Health which Significantly Impact Care: None identified The following actions were taken to address these social determinants: none    EKG Independent Interpretation: EKG not obtained    Independent Result Review and Interpretation: None obtained    Chronic conditions affecting the patient's care: As documented above in [facility]    The patient was discussed with the following consultants/services: None    Care Considerations: As documented above in [facility]    ED Course:  Diagnoses as of 07/28/25 0752   Other fatigue     Disposition   As a result of the work-up, the patient was discharged home.  he was informed of his diagnosis and instructed to come back with any concerns or worsening of condition.  he and was agreeable to the plan as discussed above.  he was given the opportunity to ask questions.  All of the patient's questions were answered.    Procedures   Procedures    Patient seen and discussed with ED attending physician.    Flavia Nagy MD  Emergency Medicine     Flavia Nagy MD  Resident  07/28/25 0756

## 2025-07-28 NOTE — ED PROVIDER NOTES
Emergency Department Provider Note        History of Present Illness     History provided by: Patient  Limitations to History: None    HPI:  Patient is a 60-year-old male with a past medical history of schizophrenia present emergency department for psychiatric evaluation.  Patient is well-known to the emergency department and presented stating that he would like to go to heaven.  Patient is ANO x 4 he has no auditory visual hallucinations no suicidal homicidal ideation.  Physical Exam   Triage vitals:  T 36.1 °C (96.9 °F)  HR 82  /77  RR 16  O2 96 % None (Room air)    Physical Exam  Constitutional:       Appearance: Normal appearance.   HENT:      Head: Normocephalic.     Eyes:      Extraocular Movements: Extraocular movements intact.       Cardiovascular:      Rate and Rhythm: Normal rate.   Pulmonary:      Effort: Pulmonary effort is normal.   Abdominal:      General: Abdomen is flat.     Musculoskeletal:         General: Normal range of motion.      Cervical back: Normal range of motion.     Skin:     General: Skin is warm.     Neurological:      Mental Status: He is alert. Mental status is at baseline.     Psychiatric:         Mood and Affect: Mood normal.         Behavior: Behavior normal.          Medical Decision Making & ED Course   Medical Decision Making:    Patient was emergency department for psychiatric evaluation patient has a known history of schizophrenia on arrival he is stated to me that he would like to go to heaven.  He is ANO x 4 with no SI HI visual auditory hallucination denies any drug use.  Although schizophrenic with occasional nonsensical speech patient is not a threat to himself or others.  I believe that the patient is at his baseline.  Patient was discharged    EKG Independent Interpretation: EKG not obtained        The patient was discussed with the following consultants/services: None      Diagnoses as of 07/28/25 0615   Encounter for psychiatric assessment           Disposition   As a result of the work-up, the patient was discharged home.  he was informed of his diagnosis and instructed to come back with any concerns or worsening of condition.  he and was agreeable to the plan as discussed above.  he was given the opportunity to ask questions.  All of the patient's questions were answered.    Procedures   Procedures    Patient seen and discussed with ED attending physician.    Whit Ferris MD  Emergency Medicine     Whit Ferris MD  Resident  07/29/25 7563

## 2025-07-28 NOTE — ED TRIAGE NOTES
"Pt here for psych eval. 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 Like I need to take a rest.\" Pt speaking nonsensical in triage, When asking pt to elaborate, pt stuttering and unable to answer further questions.   "

## 2025-07-28 NOTE — ED TRIAGE NOTES
Pt wants glucose checked and if feeling fatigued. States he was using needles because he has to much sperm. Denies HI, SI, AH, VH

## 2025-07-29 ENCOUNTER — HOSPITAL ENCOUNTER (EMERGENCY)
Facility: HOSPITAL | Age: 60
Discharge: LEFT BEFORE DISCHARGE | End: 2025-07-29
Payer: COMMERCIAL

## 2025-07-29 VITALS
RESPIRATION RATE: 16 BRPM | WEIGHT: 187 LBS | DIASTOLIC BLOOD PRESSURE: 105 MMHG | TEMPERATURE: 97.5 F | HEART RATE: 82 BPM | BODY MASS INDEX: 24.78 KG/M2 | SYSTOLIC BLOOD PRESSURE: 173 MMHG | OXYGEN SATURATION: 97 % | HEIGHT: 73 IN

## 2025-07-29 PROCEDURE — 82947 ASSAY GLUCOSE BLOOD QUANT: CPT

## 2025-07-29 PROCEDURE — 4500999001 HC ED NO CHARGE

## 2025-07-29 NOTE — ED TRIAGE NOTES
Pt presents to the ED for a glucose check. Pt states he wants to see the doctor and has no other complaints

## 2025-07-30 ENCOUNTER — CLINICAL SUPPORT (OUTPATIENT)
Dept: EMERGENCY MEDICINE | Facility: HOSPITAL | Age: 60
End: 2025-07-30
Payer: COMMERCIAL

## 2025-07-30 ENCOUNTER — HOSPITAL ENCOUNTER (EMERGENCY)
Facility: HOSPITAL | Age: 60
Discharge: HOME | End: 2025-07-30
Payer: COMMERCIAL

## 2025-07-30 VITALS
TEMPERATURE: 97.6 F | HEART RATE: 68 BPM | RESPIRATION RATE: 16 BRPM | SYSTOLIC BLOOD PRESSURE: 159 MMHG | OXYGEN SATURATION: 95 % | DIASTOLIC BLOOD PRESSURE: 96 MMHG

## 2025-07-30 DIAGNOSIS — Z59.00 HOMELESSNESS: Primary | ICD-10-CM

## 2025-07-30 LAB
ATRIAL RATE: 69 BPM
GLUCOSE BLD MANUAL STRIP-MCNC: 115 MG/DL (ref 74–99)
P AXIS: 75 DEGREES
P OFFSET: 168 MS
P ONSET: 112 MS
PR INTERVAL: 226 MS
Q ONSET: 225 MS
QRS COUNT: 11 BEATS
QRS DURATION: 90 MS
QT INTERVAL: 392 MS
QTC CALCULATION(BAZETT): 420 MS
QTC FREDERICIA: 410 MS
R AXIS: 19 DEGREES
T AXIS: 39 DEGREES
T OFFSET: 421 MS
VENTRICULAR RATE: 69 BPM

## 2025-07-30 PROCEDURE — 99283 EMERGENCY DEPT VISIT LOW MDM: CPT

## 2025-07-30 PROCEDURE — 99284 EMERGENCY DEPT VISIT MOD MDM: CPT

## 2025-07-30 PROCEDURE — 93005 ELECTROCARDIOGRAM TRACING: CPT

## 2025-07-30 SDOH — ECONOMIC STABILITY - HOUSING INSECURITY: HOMELESSNESS UNSPECIFIED: Z59.00

## 2025-07-30 NOTE — DISCHARGE INSTRUCTIONS
You were seen today requesting a shower. Unfortunately we are unable to get you a shower, here are a list of shelters in the area that may be able to get you into a shower soon. Social work is here in the mornings if you would like to talk to them about places to go.

## 2025-07-30 NOTE — ED PROVIDER NOTES
History of Present Illness       Limitations to history: None  Independent Historian: patient  External Records Reviewed: EMR, outside records, Care-everywhere    HPI:  HPI   This is a 60-year-old male with a history of schizophrenia presents to the ED requesting a shower.  Triage note states he was here for chest pain that started today, patient denied this to me twice.  No fevers, chills, chest pain, shortness of breath, abdominal pain.  States he has not taken a shower in a few weeks and would like to shower today.      Physical Exam   Physical Exam  Constitutional:       General: He is not in acute distress.     Appearance: He is well-developed. He is not ill-appearing.   HENT:      Head: Normocephalic and atraumatic.      Nose: Nose normal.      Mouth/Throat:      Mouth: Mucous membranes are moist.     Eyes:      Extraocular Movements: Extraocular movements intact.      Pupils: Pupils are equal, round, and reactive to light.       Cardiovascular:      Rate and Rhythm: Normal rate and regular rhythm.      Pulses: Normal pulses.      Heart sounds: Normal heart sounds.   Pulmonary:      Effort: Pulmonary effort is normal.      Breath sounds: Normal breath sounds.   Abdominal:      Palpations: Abdomen is soft.      Tenderness: There is no abdominal tenderness. There is no guarding.     Musculoskeletal:         General: Normal range of motion.      Cervical back: Normal range of motion and neck supple.     Skin:     General: Skin is warm.     Neurological:      General: No focal deficit present.      Mental Status: He is alert.          Triage vitals:  T 36.4 °C (97.6 °F)  HR 68  BP (!) 159/96  RR 16  O2 95 %          Medical Decision Making & ED Course     ED Course & MDM     Medical Decision Making  Vital signs reviewed, afebrile, blood pressure elevated at 159/96, satting well on room air and speaking in full sentences in no acute distress.  History obtained from patient and chart.  Although triage note  states he is here for chest pain, he denies this to me and reports he would like a shower.  Patient seen with at least a dozen ED wristbands on his right wrist.  Patient frequents the ED with over 15 visits this month.  Patient again denies chest pain or SOB.  Cardiopulmonary exam grossly unremarkable.  Sinus rhythm with a rate of 69 bpm, first-degree AV block noted in today's EKG as well as EKG from May 31.  No ST segment elevation.  Nonspecific T wave abnormalities improved in inferior and lateral leads. Patient given snacks and discharged in stable condition.     ----    Visit Vitals  BP (!) 159/96   Pulse 68   Temp 36.4 °C (97.6 °F) (Temporal)   Resp 16   SpO2 95%   Smoking Status Every Day        Labs Reviewed - No data to display    No orders to display       ED Course:  Diagnoses as of 07/30/25 0223   Homelessness     Disposition   As result of the workup, the patient was discharged home.  Patient was informed of their diagnosis and instructed to come back with any concerns or worsening of condition, agreeable to the plan above.  Patient given the opportunity ask questions, all of the patient's questions were answered.      Procedures   Procedures    COMMENT: Please note this report has been produced using speech recognition software and may contain errors related that system including errors in grammar, punctuation, spelling as well as words and phrases that may be inappropriate.  If there are any questions or concerns please feel free to contact the dictating provider for clarification.     Crissy Redding PA-C  Emergency Medicine      Crissy Redding PA-C  07/30/25 0229

## 2025-08-02 ENCOUNTER — HOSPITAL ENCOUNTER (EMERGENCY)
Facility: HOSPITAL | Age: 60
Discharge: HOME | End: 2025-08-02
Payer: COMMERCIAL

## 2025-08-02 PROCEDURE — 4500999001 HC ED NO CHARGE

## 2025-08-02 PROCEDURE — 99281 EMR DPT VST MAYX REQ PHY/QHP: CPT

## 2025-08-02 NOTE — ED TRIAGE NOTES
Pt wants his BG checked. Has been to ED most days the past month for BG check, typically 90s-100s. No complaints.

## 2025-08-04 ENCOUNTER — APPOINTMENT (OUTPATIENT)
Facility: CLINIC | Age: 60
End: 2025-08-04
Payer: COMMERCIAL

## 2025-08-05 ENCOUNTER — HOSPITAL ENCOUNTER (EMERGENCY)
Facility: HOSPITAL | Age: 60
Discharge: HOME | End: 2025-08-05
Payer: COMMERCIAL

## 2025-08-05 VITALS
TEMPERATURE: 97.2 F | HEART RATE: 64 BPM | OXYGEN SATURATION: 97 % | DIASTOLIC BLOOD PRESSURE: 85 MMHG | RESPIRATION RATE: 16 BRPM | SYSTOLIC BLOOD PRESSURE: 151 MMHG

## 2025-08-05 DIAGNOSIS — Z59.819 HOUSING INSECURITY: Primary | ICD-10-CM

## 2025-08-05 LAB — GLUCOSE BLD MANUAL STRIP-MCNC: 88 MG/DL (ref 74–99)

## 2025-08-05 PROCEDURE — 99284 EMERGENCY DEPT VISIT MOD MDM: CPT | Performed by: PHYSICIAN ASSISTANT

## 2025-08-05 PROCEDURE — 82947 ASSAY GLUCOSE BLOOD QUANT: CPT

## 2025-08-05 PROCEDURE — 99281 EMR DPT VST MAYX REQ PHY/QHP: CPT

## 2025-08-05 PROCEDURE — 99282 EMERGENCY DEPT VISIT SF MDM: CPT

## 2025-08-05 SDOH — ECONOMIC STABILITY - HOUSING INSECURITY: HOUSING INSTABILITY UNSPECIFIED: Z59.819

## 2025-08-05 ASSESSMENT — PAIN SCALES - GENERAL: PAINLEVEL_OUTOF10: 0 - NO PAIN

## 2025-08-05 ASSESSMENT — PAIN - FUNCTIONAL ASSESSMENT: PAIN_FUNCTIONAL_ASSESSMENT: 0-10

## 2025-08-05 NOTE — ED TRIAGE NOTES
Pt states he is here to see a SW and doctor for his sugar check. Denies any, denies SI/HI. BG 88 in triage

## 2025-08-05 NOTE — ED PROVIDER NOTES
Emergency Department Provider Note        History of Present Illness     60-year-old male with history of schizophrenia presenting requesting his BG check and food.  States he is staying at a [employer] lately.  Denies any medical complaints.  Denies any fall or any other injury.  Denies weakness or paresthesias.  Denies chest pain cough shortness of breath.  Denies fever chills night sweats or rigors.  Denies any MSK pain.      Medical History[1]  Surgical History[2]  Social History[3]  Allergies[4]      External Records Reviewed including ED notes, H&P, Discharge Summary, outpatient PCP/specialist notes.  Physical Exam       Triage Vitals: T 36.2 °C (97.2 °F)  HR 64  /85  RR 16  O2 97 % None (Room air)  GEN: NAD, well-appearing ambulating comfortably  EYES:  EOMs grossly intact, anicteric sclera  JUSTINE: Mucosa moist.  NECK: Supple.  CARD: RRR  PULMONARY: Moving air well. Clear all lung fields.  ABDOMEN: Soft, no guarding, no rigidity. Nontender. NABS  EXTREMITIES: Full ROM, no pitting edema,   SKIN: Intact, warm and dry  NEURO: Alert and oriented x 3, speech is clear, no obvious deficits noted.   Psych: Able to carry linear conversation      Medical Decision Making & ED Course     60-year-old male presenting requesting BG check and food.  On exam he is well-appearing ambulating comfortably.  VSS.  Exam is otherwise unremarkable.  He was provided breakfast and a bus pass.  Educated that he does not need repeat presentations for BG check, provided housing/food resources.  Return precautions reviewed.    Diagnoses as of 08/05/25 0758   Housing insecurity     No orders to display     Labs Reviewed   POCT GLUCOSE - Normal       Result Value    POCT Glucose 88         ----------------------------------------------------------------------------------------------------------------------------    This note was dictated using a speech recognition program.  While an attempt was made at proof reading to minimize errors,  minor errors in transcription may be present call for questions.       [1]   Past Medical History:  Diagnosis Date    Hypertension    [2] No past surgical history on file.  [3]   Social History  Socioeconomic History    Marital status:    Tobacco Use    Smoking status: Every Day     Types: Cigarettes    Smokeless tobacco: Never   Substance and Sexual Activity    Drug use: Defer     Social Drivers of Health     Financial Resource Strain: High Risk (4/4/2025)    Received from [facility]    Overall Financial Resource Strain (CARDIA)     Difficulty of Paying Living Expenses: Very hard   Food Insecurity: Food Insecurity Present (4/4/2025)    Received from [facility]    Hunger Vital Sign     Within the past 12 months, you worried that your food would run out before you got the money to buy more.: Sometimes true     Within the past 12 months, the food you bought just didn't last and you didn't have money to get more.: Sometimes true   Transportation Needs: Unmet Transportation Needs (4/4/2025)    Received from [facility]    PRAPARE - Transportation     Lack of Transportation (Medical): Yes     Lack of Transportation (Non-Medical): Yes   Physical Activity: Sufficiently Active (4/4/2025)    Received from [facility]    Exercise Vital Sign     On average, how many days per week do you engage in moderate to strenuous exercise (like a brisk walk)?: 7 days     On average, how many minutes do you engage in exercise at this level?: 60 min   Stress: Stress Concern Present (4/4/2025)    Received from [facility]    [employer] [place] [employer] - Occupational Stress Questionnaire     Feeling of Stress : Rather much   Social Connections: Not on File (9/13/2024)    Received from [facility]    Social Connections     Connectedness: 0   Housing Stability: High Risk (4/4/2025)    Received from [facility]    Housing Stability Vital Sign     In the last 12 months, was there a time when you  were not able to pay the mortgage or rent on time?: Yes     At any time in the past 12 months, were you homeless or living in a shelter (including now)?: Yes   [4]   Allergies  Allergen Reactions    Divalproex Unknown    Haloperidol Other    Penicillins Unknown        Kirk Andersen PA-C  08/05/25 0800

## 2025-08-07 ENCOUNTER — HOSPITAL ENCOUNTER (EMERGENCY)
Facility: HOSPITAL | Age: 60
Discharge: HOME | End: 2025-08-08
Payer: COMMERCIAL

## 2025-08-07 VITALS
SYSTOLIC BLOOD PRESSURE: 169 MMHG | DIASTOLIC BLOOD PRESSURE: 102 MMHG | HEART RATE: 86 BPM | RESPIRATION RATE: 16 BRPM | OXYGEN SATURATION: 97 % | WEIGHT: 187 LBS | HEIGHT: 69 IN | TEMPERATURE: 98.2 F | BODY MASS INDEX: 27.7 KG/M2

## 2025-08-07 PROCEDURE — 4500999001 HC ED NO CHARGE

## 2025-08-07 PROCEDURE — 99281 EMR DPT VST MAYX REQ PHY/QHP: CPT

## 2025-08-07 ASSESSMENT — LIFESTYLE VARIABLES
EVER HAD A DRINK FIRST THING IN THE MORNING TO STEADY YOUR NERVES TO GET RID OF A HANGOVER: NO
EVER FELT BAD OR GUILTY ABOUT YOUR DRINKING: NO
TOTAL SCORE: 0
HAVE PEOPLE ANNOYED YOU BY CRITICIZING YOUR DRINKING: NO
HAVE YOU EVER FELT YOU SHOULD CUT DOWN ON YOUR DRINKING: NO

## 2025-08-07 ASSESSMENT — PAIN - FUNCTIONAL ASSESSMENT: PAIN_FUNCTIONAL_ASSESSMENT: 0-10

## 2025-08-07 ASSESSMENT — PAIN SCALES - GENERAL: PAINLEVEL_OUTOF10: 0 - NO PAIN

## 2025-08-07 NOTE — ED TRIAGE NOTES
Pt presents to ED reporting that he Is here to get to the next place, he reports that he  is suppose to come here and wait for his mother to come nad take him to [facility]. Pt denies having any suicidal , or homicidal ideations. Pt also denies having any auditory or visual hallucinations

## 2025-08-12 ENCOUNTER — HOSPITAL ENCOUNTER (EMERGENCY)
Facility: HOSPITAL | Age: 60
Discharge: LEFT BEFORE DISCHARGE | End: 2025-08-12
Payer: COMMERCIAL

## 2025-08-12 VITALS
DIASTOLIC BLOOD PRESSURE: 64 MMHG | OXYGEN SATURATION: 95 % | SYSTOLIC BLOOD PRESSURE: 106 MMHG | TEMPERATURE: 97.9 F | WEIGHT: 189 LBS | BODY MASS INDEX: 27.99 KG/M2 | HEART RATE: 99 BPM | RESPIRATION RATE: 16 BRPM | HEIGHT: 69 IN

## 2025-08-12 PROCEDURE — 4500999001 HC ED NO CHARGE

## 2025-08-12 PROCEDURE — 99281 EMR DPT VST MAYX REQ PHY/QHP: CPT

## 2025-08-12 ASSESSMENT — PAIN - FUNCTIONAL ASSESSMENT: PAIN_FUNCTIONAL_ASSESSMENT: 0-10

## 2025-08-12 ASSESSMENT — PAIN SCALES - GENERAL: PAINLEVEL_OUTOF10: 0 - NO PAIN

## 2025-08-13 ENCOUNTER — HOSPITAL ENCOUNTER (EMERGENCY)
Facility: HOSPITAL | Age: 60
Discharge: LEFT BEFORE DISCHARGE | End: 2025-08-13
Payer: COMMERCIAL

## 2025-08-13 PROCEDURE — 4500999001 HC ED NO CHARGE

## 2025-08-16 ENCOUNTER — CLINICAL SUPPORT (OUTPATIENT)
Dept: EMERGENCY MEDICINE | Facility: HOSPITAL | Age: 60
End: 2025-08-16
Payer: COMMERCIAL

## 2025-08-16 ENCOUNTER — HOSPITAL ENCOUNTER (EMERGENCY)
Facility: HOSPITAL | Age: 60
Discharge: LEFT BEFORE DISCHARGE | End: 2025-08-16
Payer: COMMERCIAL

## 2025-08-16 DIAGNOSIS — R07.9 CHEST PAIN, UNSPECIFIED TYPE: Primary | ICD-10-CM

## 2025-08-16 LAB
ATRIAL RATE: 89 BPM
P AXIS: 85 DEGREES
P OFFSET: 178 MS
P ONSET: 122 MS
PR INTERVAL: 200 MS
Q ONSET: 222 MS
QRS COUNT: 15 BEATS
QRS DURATION: 90 MS
QT INTERVAL: 360 MS
QTC CALCULATION(BAZETT): 438 MS
QTC FREDERICIA: 410 MS
R AXIS: 23 DEGREES
T AXIS: 83 DEGREES
T OFFSET: 402 MS
VENTRICULAR RATE: 89 BPM

## 2025-08-16 PROCEDURE — 93005 ELECTROCARDIOGRAM TRACING: CPT

## 2025-08-16 PROCEDURE — 99283 EMERGENCY DEPT VISIT LOW MDM: CPT

## 2025-08-16 PROCEDURE — 99284 EMERGENCY DEPT VISIT MOD MDM: CPT | Performed by: PHYSICIAN ASSISTANT

## 2025-08-17 ENCOUNTER — HOSPITAL ENCOUNTER (EMERGENCY)
Facility: HOSPITAL | Age: 60
Discharge: HOME | End: 2025-08-17
Payer: COMMERCIAL

## 2025-08-17 VITALS
BODY MASS INDEX: 24.39 KG/M2 | SYSTOLIC BLOOD PRESSURE: 157 MMHG | WEIGHT: 184 LBS | HEART RATE: 72 BPM | OXYGEN SATURATION: 97 % | TEMPERATURE: 97.7 F | DIASTOLIC BLOOD PRESSURE: 101 MMHG | HEIGHT: 73 IN | RESPIRATION RATE: 18 BRPM

## 2025-08-17 DIAGNOSIS — L29.9 ITCHING: Primary | ICD-10-CM

## 2025-08-17 PROCEDURE — 2500000001 HC RX 250 WO HCPCS SELF ADMINISTERED DRUGS (ALT 637 FOR MEDICARE OP): Mod: SE | Performed by: NURSE PRACTITIONER

## 2025-08-17 PROCEDURE — 99283 EMERGENCY DEPT VISIT LOW MDM: CPT

## 2025-08-17 PROCEDURE — 99284 EMERGENCY DEPT VISIT MOD MDM: CPT | Performed by: NURSE PRACTITIONER

## 2025-08-17 RX ORDER — HYDROXYZINE HYDROCHLORIDE 25 MG/1
25 TABLET, FILM COATED ORAL EVERY 6 HOURS
Qty: 12 TABLET | Refills: 0 | Status: SHIPPED | OUTPATIENT
Start: 2025-08-17 | End: 2025-08-17

## 2025-08-17 RX ORDER — HYDROXYZINE HYDROCHLORIDE 25 MG/1
25 TABLET, FILM COATED ORAL ONCE
Status: COMPLETED | OUTPATIENT
Start: 2025-08-17 | End: 2025-08-17

## 2025-08-17 RX ORDER — HYDROXYZINE HYDROCHLORIDE 25 MG/1
25 TABLET, FILM COATED ORAL EVERY 6 HOURS PRN
Qty: 12 TABLET | Refills: 0 | Status: SHIPPED | OUTPATIENT
Start: 2025-08-17 | End: 2025-08-20

## 2025-08-17 RX ADMIN — HYDROXYZINE HYDROCHLORIDE 25 MG: 25 TABLET, FILM COATED ORAL at 09:39

## 2025-08-17 ASSESSMENT — ENCOUNTER SYMPTOMS
DIZZINESS: 0
VOMITING: 0
FEVER: 0
COUGH: 0
NAUSEA: 0
HEADACHES: 0
WEAKNESS: 0
TROUBLE SWALLOWING: 0
HALLUCINATIONS: 0
CHILLS: 0
CONFUSION: 0
NERVOUS/ANXIOUS: 0
SHORTNESS OF BREATH: 0
BACK PAIN: 0

## 2025-08-17 ASSESSMENT — PAIN - FUNCTIONAL ASSESSMENT: PAIN_FUNCTIONAL_ASSESSMENT: 0-10

## 2025-08-17 ASSESSMENT — PAIN DESCRIPTION - PROGRESSION: CLINICAL_PROGRESSION: NOT CHANGED

## 2025-08-17 ASSESSMENT — PAIN SCALES - GENERAL: PAINLEVEL_OUTOF10: 0 - NO PAIN

## 2025-08-18 ENCOUNTER — HOSPITAL ENCOUNTER (EMERGENCY)
Facility: HOSPITAL | Age: 60
Discharge: LEFT BEFORE DISCHARGE | End: 2025-08-18
Payer: COMMERCIAL

## 2025-08-18 VITALS
HEIGHT: 73 IN | TEMPERATURE: 98 F | RESPIRATION RATE: 18 BRPM | WEIGHT: 184 LBS | BODY MASS INDEX: 24.39 KG/M2 | OXYGEN SATURATION: 96 % | DIASTOLIC BLOOD PRESSURE: 101 MMHG | HEART RATE: 99 BPM | SYSTOLIC BLOOD PRESSURE: 170 MMHG

## 2025-08-18 PROCEDURE — 4500999001 HC ED NO CHARGE

## 2025-08-18 ASSESSMENT — PAIN DESCRIPTION - LOCATION: LOCATION: GENERALIZED

## 2025-08-18 ASSESSMENT — PAIN SCALES - GENERAL: PAINLEVEL_OUTOF10: 8

## 2025-08-18 ASSESSMENT — PAIN - FUNCTIONAL ASSESSMENT: PAIN_FUNCTIONAL_ASSESSMENT: 0-10

## 2025-08-22 ENCOUNTER — HOSPITAL ENCOUNTER (EMERGENCY)
Facility: HOSPITAL | Age: 60
Discharge: LEFT BEFORE DISCHARGE | End: 2025-08-22
Payer: COMMERCIAL

## 2025-08-22 VITALS
TEMPERATURE: 98.1 F | HEART RATE: 73 BPM | HEIGHT: 73 IN | DIASTOLIC BLOOD PRESSURE: 91 MMHG | RESPIRATION RATE: 18 BRPM | WEIGHT: 190 LBS | OXYGEN SATURATION: 99 % | BODY MASS INDEX: 25.18 KG/M2 | SYSTOLIC BLOOD PRESSURE: 158 MMHG

## 2025-08-22 PROCEDURE — 4500999001 HC ED NO CHARGE: Performed by: EMERGENCY MEDICINE

## 2025-08-22 PROCEDURE — 99282 EMERGENCY DEPT VISIT SF MDM: CPT

## 2025-08-22 ASSESSMENT — PAIN SCALES - GENERAL: PAINLEVEL_OUTOF10: 0 - NO PAIN

## 2025-08-22 ASSESSMENT — PAIN - FUNCTIONAL ASSESSMENT: PAIN_FUNCTIONAL_ASSESSMENT: 0-10

## 2025-08-23 ENCOUNTER — HOSPITAL ENCOUNTER (EMERGENCY)
Facility: HOSPITAL | Age: 60
Discharge: HOME | End: 2025-08-23
Payer: COMMERCIAL

## 2025-08-23 VITALS
WEIGHT: 190 LBS | HEIGHT: 73 IN | BODY MASS INDEX: 25.18 KG/M2 | DIASTOLIC BLOOD PRESSURE: 96 MMHG | RESPIRATION RATE: 15 BRPM | HEART RATE: 75 BPM | SYSTOLIC BLOOD PRESSURE: 160 MMHG | OXYGEN SATURATION: 100 % | TEMPERATURE: 96.5 F

## 2025-08-23 DIAGNOSIS — R53.83 FATIGUE, UNSPECIFIED TYPE: Primary | ICD-10-CM

## 2025-08-23 LAB — GLUCOSE BLD MANUAL STRIP-MCNC: 99 MG/DL (ref 74–99)

## 2025-08-23 PROCEDURE — 82947 ASSAY GLUCOSE BLOOD QUANT: CPT

## 2025-08-23 PROCEDURE — 99283 EMERGENCY DEPT VISIT LOW MDM: CPT | Performed by: NURSE PRACTITIONER

## 2025-08-23 PROCEDURE — 99282 EMERGENCY DEPT VISIT SF MDM: CPT

## 2025-08-30 ENCOUNTER — HOSPITAL ENCOUNTER (EMERGENCY)
Facility: HOSPITAL | Age: 60
Discharge: HOME | End: 2025-08-30
Payer: COMMERCIAL

## 2025-08-30 ASSESSMENT — PAIN - FUNCTIONAL ASSESSMENT: PAIN_FUNCTIONAL_ASSESSMENT: 0-10

## 2025-08-30 ASSESSMENT — PAIN SCALES - GENERAL: PAINLEVEL_OUTOF10: 0 - NO PAIN

## 2025-09-03 ENCOUNTER — HOSPITAL ENCOUNTER (EMERGENCY)
Facility: HOSPITAL | Age: 60
Discharge: HOME | End: 2025-09-03
Payer: COMMERCIAL

## 2025-09-03 VITALS
OXYGEN SATURATION: 97 % | HEIGHT: 73 IN | TEMPERATURE: 98.4 F | SYSTOLIC BLOOD PRESSURE: 143 MMHG | RESPIRATION RATE: 16 BRPM | BODY MASS INDEX: 23.86 KG/M2 | DIASTOLIC BLOOD PRESSURE: 94 MMHG | HEART RATE: 77 BPM | WEIGHT: 180 LBS

## 2025-09-03 PROCEDURE — 99282 EMERGENCY DEPT VISIT SF MDM: CPT

## 2025-09-03 ASSESSMENT — PAIN SCALES - GENERAL: PAINLEVEL_OUTOF10: 0 - NO PAIN

## 2025-09-03 ASSESSMENT — PAIN - FUNCTIONAL ASSESSMENT: PAIN_FUNCTIONAL_ASSESSMENT: 0-10

## 2025-09-04 ENCOUNTER — HOSPITAL ENCOUNTER (EMERGENCY)
Facility: HOSPITAL | Age: 60
Discharge: LEFT BEFORE DISCHARGE | End: 2025-09-05
Payer: COMMERCIAL

## 2025-09-04 ENCOUNTER — HOSPITAL ENCOUNTER (EMERGENCY)
Facility: HOSPITAL | Age: 60
Discharge: STILL ADMITTED OR ADMINISTRATIVE | End: 2025-09-04
Payer: COMMERCIAL

## 2025-09-04 VITALS
TEMPERATURE: 97.7 F | SYSTOLIC BLOOD PRESSURE: 146 MMHG | HEIGHT: 73 IN | RESPIRATION RATE: 16 BRPM | OXYGEN SATURATION: 99 % | DIASTOLIC BLOOD PRESSURE: 87 MMHG | WEIGHT: 180 LBS | HEART RATE: 80 BPM | BODY MASS INDEX: 23.86 KG/M2

## 2025-09-04 VITALS
OXYGEN SATURATION: 96 % | SYSTOLIC BLOOD PRESSURE: 166 MMHG | HEART RATE: 79 BPM | TEMPERATURE: 97.9 F | DIASTOLIC BLOOD PRESSURE: 89 MMHG | RESPIRATION RATE: 16 BRPM

## 2025-09-04 LAB — GLUCOSE BLD MANUAL STRIP-MCNC: 86 MG/DL (ref 74–99)

## 2025-09-04 PROCEDURE — 99282 EMERGENCY DEPT VISIT SF MDM: CPT

## 2025-09-04 PROCEDURE — 82947 ASSAY GLUCOSE BLOOD QUANT: CPT

## 2025-09-04 ASSESSMENT — PAIN - FUNCTIONAL ASSESSMENT
PAIN_FUNCTIONAL_ASSESSMENT: 0-10
PAIN_FUNCTIONAL_ASSESSMENT: 0-10

## 2025-09-04 ASSESSMENT — PAIN SCALES - GENERAL
PAINLEVEL_OUTOF10: 0 - NO PAIN
PAINLEVEL_OUTOF10: 0 - NO PAIN

## 2025-09-06 ENCOUNTER — HOSPITAL ENCOUNTER (EMERGENCY)
Facility: HOSPITAL | Age: 60
Discharge: HOME | End: 2025-09-06
Payer: COMMERCIAL

## 2025-09-06 VITALS
DIASTOLIC BLOOD PRESSURE: 110 MMHG | HEART RATE: 81 BPM | TEMPERATURE: 96.8 F | SYSTOLIC BLOOD PRESSURE: 182 MMHG | OXYGEN SATURATION: 97 % | RESPIRATION RATE: 18 BRPM

## 2025-09-06 DIAGNOSIS — Z00.00 WELLNESS EXAMINATION: Primary | ICD-10-CM

## 2025-09-06 DIAGNOSIS — T73.0XXA HUNGRY, INITIAL ENCOUNTER: ICD-10-CM

## 2025-09-06 DIAGNOSIS — Z76.89 FREQUENT ATTENDER OF ACCIDENT AND EMERGENCY DEPARTMENT: ICD-10-CM

## 2025-09-06 LAB — GLUCOSE BLD MANUAL STRIP-MCNC: 102 MG/DL (ref 74–99)

## 2025-09-06 PROCEDURE — 99282 EMERGENCY DEPT VISIT SF MDM: CPT

## 2025-09-06 PROCEDURE — 82947 ASSAY GLUCOSE BLOOD QUANT: CPT

## 2025-09-15 ENCOUNTER — APPOINTMENT (OUTPATIENT)
Facility: CLINIC | Age: 60
End: 2025-09-15
Payer: COMMERCIAL